# Patient Record
Sex: FEMALE | Race: OTHER | HISPANIC OR LATINO | URBAN - METROPOLITAN AREA
[De-identification: names, ages, dates, MRNs, and addresses within clinical notes are randomized per-mention and may not be internally consistent; named-entity substitution may affect disease eponyms.]

---

## 2018-12-20 ENCOUNTER — OUTPATIENT (OUTPATIENT)
Dept: OUTPATIENT SERVICES | Facility: HOSPITAL | Age: 33
LOS: 1 days | End: 2018-12-20
Payer: MEDICAID

## 2018-12-20 DIAGNOSIS — Z33.1 PREGNANT STATE, INCIDENTAL: ICD-10-CM

## 2018-12-20 DIAGNOSIS — Z01.818 ENCOUNTER FOR OTHER PREPROCEDURAL EXAMINATION: ICD-10-CM

## 2018-12-20 LAB
BLD GP AB SCN SERPL QL: NEGATIVE — SIGNIFICANT CHANGE UP
HCT VFR BLD CALC: 34.4 % — LOW (ref 34.5–45)
HGB BLD-MCNC: 11.3 G/DL — LOW (ref 11.5–15.5)
MCHC RBC-ENTMCNC: 28.5 PG — SIGNIFICANT CHANGE UP (ref 27–34)
MCHC RBC-ENTMCNC: 32.8 GM/DL — SIGNIFICANT CHANGE UP (ref 32–36)
MCV RBC AUTO: 86.6 FL — SIGNIFICANT CHANGE UP (ref 80–100)
PLATELET # BLD AUTO: 114 K/UL — LOW (ref 150–400)
RBC # BLD: 3.97 M/UL — SIGNIFICANT CHANGE UP (ref 3.8–5.2)
RBC # FLD: 13.2 % — SIGNIFICANT CHANGE UP (ref 10.3–14.5)
RH IG SCN BLD-IMP: POSITIVE — SIGNIFICANT CHANGE UP
WBC # BLD: 7.1 K/UL — SIGNIFICANT CHANGE UP (ref 3.8–10.5)
WBC # FLD AUTO: 7.1 K/UL — SIGNIFICANT CHANGE UP (ref 3.8–10.5)

## 2018-12-20 PROCEDURE — 86850 RBC ANTIBODY SCREEN: CPT

## 2018-12-20 PROCEDURE — G0463: CPT

## 2018-12-20 PROCEDURE — 86901 BLOOD TYPING SEROLOGIC RH(D): CPT

## 2018-12-20 PROCEDURE — 86900 BLOOD TYPING SEROLOGIC ABO: CPT

## 2018-12-20 PROCEDURE — 85027 COMPLETE CBC AUTOMATED: CPT

## 2018-12-30 ENCOUNTER — TRANSCRIPTION ENCOUNTER (OUTPATIENT)
Age: 33
End: 2018-12-30

## 2018-12-31 ENCOUNTER — INPATIENT (INPATIENT)
Facility: HOSPITAL | Age: 33
LOS: 2 days | Discharge: HOME | End: 2019-01-03
Attending: STUDENT IN AN ORGANIZED HEALTH CARE EDUCATION/TRAINING PROGRAM | Admitting: STUDENT IN AN ORGANIZED HEALTH CARE EDUCATION/TRAINING PROGRAM
Payer: MEDICAID

## 2018-12-31 VITALS
SYSTOLIC BLOOD PRESSURE: 114 MMHG | HEART RATE: 86 BPM | OXYGEN SATURATION: 100 % | RESPIRATION RATE: 16 BRPM | TEMPERATURE: 98 F | DIASTOLIC BLOOD PRESSURE: 55 MMHG

## 2018-12-31 DIAGNOSIS — Z3A.00 WEEKS OF GESTATION OF PREGNANCY NOT SPECIFIED: ICD-10-CM

## 2018-12-31 DIAGNOSIS — O26.899 OTHER SPECIFIED PREGNANCY RELATED CONDITIONS, UNSPECIFIED TRIMESTER: ICD-10-CM

## 2018-12-31 DIAGNOSIS — Z34.80 ENCOUNTER FOR SUPERVISION OF OTHER NORMAL PREGNANCY, UNSPECIFIED TRIMESTER: ICD-10-CM

## 2018-12-31 LAB
BASOPHILS # BLD AUTO: 0 K/UL — SIGNIFICANT CHANGE UP (ref 0–0.2)
BASOPHILS NFR BLD AUTO: 0.3 % — SIGNIFICANT CHANGE UP (ref 0–2)
BLD GP AB SCN SERPL QL: NEGATIVE — SIGNIFICANT CHANGE UP
EOSINOPHIL # BLD AUTO: 0 K/UL — SIGNIFICANT CHANGE UP (ref 0–0.5)
EOSINOPHIL NFR BLD AUTO: 0.2 % — SIGNIFICANT CHANGE UP (ref 0–6)
HBV SURFACE AG SERPL QL IA: SIGNIFICANT CHANGE UP
HCT VFR BLD CALC: 33.5 % — LOW (ref 34.5–45)
HGB BLD-MCNC: 11.6 G/DL — SIGNIFICANT CHANGE UP (ref 11.5–15.5)
LYMPHOCYTES # BLD AUTO: 1.4 K/UL — SIGNIFICANT CHANGE UP (ref 1–3.3)
LYMPHOCYTES # BLD AUTO: 13.6 % — SIGNIFICANT CHANGE UP (ref 13–44)
MCHC RBC-ENTMCNC: 30.1 PG — SIGNIFICANT CHANGE UP (ref 27–34)
MCHC RBC-ENTMCNC: 34.7 GM/DL — SIGNIFICANT CHANGE UP (ref 32–36)
MCV RBC AUTO: 86.7 FL — SIGNIFICANT CHANGE UP (ref 80–100)
MONOCYTES # BLD AUTO: 0.8 K/UL — SIGNIFICANT CHANGE UP (ref 0–0.9)
MONOCYTES NFR BLD AUTO: 8.3 % — SIGNIFICANT CHANGE UP (ref 2–14)
NEUTROPHILS # BLD AUTO: 7.9 K/UL — HIGH (ref 1.8–7.4)
NEUTROPHILS NFR BLD AUTO: 77.6 % — HIGH (ref 43–77)
PLATELET # BLD AUTO: 101 K/UL — LOW (ref 150–400)
RBC # BLD: 3.87 M/UL — SIGNIFICANT CHANGE UP (ref 3.8–5.2)
RBC # FLD: 12.4 % — SIGNIFICANT CHANGE UP (ref 10.3–14.5)
RH IG SCN BLD-IMP: POSITIVE — SIGNIFICANT CHANGE UP
WBC # BLD: 10.2 K/UL — SIGNIFICANT CHANGE UP (ref 3.8–10.5)
WBC # FLD AUTO: 10.2 K/UL — SIGNIFICANT CHANGE UP (ref 3.8–10.5)

## 2018-12-31 RX ORDER — ACETAMINOPHEN 500 MG
975 TABLET ORAL EVERY 6 HOURS
Qty: 0 | Refills: 0 | Status: DISCONTINUED | OUTPATIENT
Start: 2018-12-31 | End: 2019-01-03

## 2018-12-31 RX ORDER — LANOLIN
1 OINTMENT (GRAM) TOPICAL
Qty: 0 | Refills: 0 | Status: DISCONTINUED | OUTPATIENT
Start: 2019-01-01 | End: 2019-01-03

## 2018-12-31 RX ORDER — FERROUS SULFATE 325(65) MG
325 TABLET ORAL DAILY
Qty: 0 | Refills: 0 | Status: DISCONTINUED | OUTPATIENT
Start: 2019-01-01 | End: 2019-01-03

## 2018-12-31 RX ORDER — OXYCODONE HYDROCHLORIDE 5 MG/1
5 TABLET ORAL EVERY 4 HOURS
Qty: 0 | Refills: 0 | Status: COMPLETED | OUTPATIENT
Start: 2019-01-02 | End: 2019-01-09

## 2018-12-31 RX ORDER — SODIUM CHLORIDE 9 MG/ML
1000 INJECTION, SOLUTION INTRAVENOUS
Qty: 0 | Refills: 0 | Status: DISCONTINUED | OUTPATIENT
Start: 2018-12-31 | End: 2019-01-03

## 2018-12-31 RX ORDER — SIMETHICONE 80 MG/1
80 TABLET, CHEWABLE ORAL EVERY 4 HOURS
Qty: 0 | Refills: 0 | Status: DISCONTINUED | OUTPATIENT
Start: 2018-12-31 | End: 2019-01-03

## 2018-12-31 RX ORDER — IBUPROFEN 200 MG
600 TABLET ORAL EVERY 6 HOURS
Qty: 0 | Refills: 0 | Status: COMPLETED | OUTPATIENT
Start: 2019-01-02 | End: 2019-12-01

## 2018-12-31 RX ORDER — DOCUSATE SODIUM 100 MG
100 CAPSULE ORAL
Qty: 0 | Refills: 0 | Status: DISCONTINUED | OUTPATIENT
Start: 2019-01-01 | End: 2019-01-01

## 2018-12-31 RX ORDER — GLYCERIN ADULT
1 SUPPOSITORY, RECTAL RECTAL AT BEDTIME
Qty: 0 | Refills: 0 | Status: DISCONTINUED | OUTPATIENT
Start: 2019-01-01 | End: 2019-01-03

## 2018-12-31 RX ORDER — DEXAMETHASONE 0.5 MG/5ML
4 ELIXIR ORAL EVERY 6 HOURS
Qty: 0 | Refills: 0 | Status: DISCONTINUED | OUTPATIENT
Start: 2018-12-31 | End: 2019-01-02

## 2018-12-31 RX ORDER — SODIUM CHLORIDE 9 MG/ML
1000 INJECTION, SOLUTION INTRAVENOUS
Qty: 0 | Refills: 0 | Status: DISCONTINUED | OUTPATIENT
Start: 2019-01-01 | End: 2019-01-03

## 2018-12-31 RX ORDER — CITRIC ACID/SODIUM CITRATE 300-500 MG
30 SOLUTION, ORAL ORAL ONCE
Qty: 0 | Refills: 0 | Status: DISCONTINUED | OUTPATIENT
Start: 2018-12-31 | End: 2019-01-03

## 2018-12-31 RX ORDER — ONDANSETRON 8 MG/1
4 TABLET, FILM COATED ORAL EVERY 6 HOURS
Qty: 0 | Refills: 0 | Status: DISCONTINUED | OUTPATIENT
Start: 2018-12-31 | End: 2019-01-02

## 2018-12-31 RX ORDER — ACETAMINOPHEN 500 MG
1000 TABLET ORAL ONCE
Qty: 0 | Refills: 0 | Status: COMPLETED | OUTPATIENT
Start: 2018-12-31 | End: 2018-12-31

## 2018-12-31 RX ORDER — NALOXONE HYDROCHLORIDE 4 MG/.1ML
0.1 SPRAY NASAL
Qty: 0 | Refills: 0 | Status: DISCONTINUED | OUTPATIENT
Start: 2018-12-31 | End: 2019-01-02

## 2018-12-31 RX ORDER — TETANUS TOXOID, REDUCED DIPHTHERIA TOXOID AND ACELLULAR PERTUSSIS VACCINE, ADSORBED 5; 2.5; 8; 8; 2.5 [IU]/.5ML; [IU]/.5ML; UG/.5ML; UG/.5ML; UG/.5ML
0.5 SUSPENSION INTRAMUSCULAR ONCE
Qty: 0 | Refills: 0 | Status: DISCONTINUED | OUTPATIENT
Start: 2019-01-01 | End: 2019-01-03

## 2018-12-31 RX ORDER — KETOROLAC TROMETHAMINE 30 MG/ML
30 SYRINGE (ML) INJECTION EVERY 6 HOURS
Qty: 0 | Refills: 0 | Status: DISCONTINUED | OUTPATIENT
Start: 2018-12-31 | End: 2019-01-02

## 2018-12-31 RX ORDER — SODIUM CHLORIDE 9 MG/ML
1000 INJECTION, SOLUTION INTRAVENOUS ONCE
Qty: 0 | Refills: 0 | Status: COMPLETED | OUTPATIENT
Start: 2018-12-31 | End: 2018-12-31

## 2018-12-31 RX ORDER — METOCLOPRAMIDE HCL 10 MG
10 TABLET ORAL ONCE
Qty: 0 | Refills: 0 | Status: COMPLETED | OUTPATIENT
Start: 2018-12-31 | End: 2018-12-31

## 2018-12-31 RX ORDER — DIPHENHYDRAMINE HCL 50 MG
25 CAPSULE ORAL EVERY 6 HOURS
Qty: 0 | Refills: 0 | Status: DISCONTINUED | OUTPATIENT
Start: 2018-12-31 | End: 2019-01-03

## 2018-12-31 RX ORDER — OXYCODONE HYDROCHLORIDE 5 MG/1
5 TABLET ORAL
Qty: 0 | Refills: 0 | Status: COMPLETED | OUTPATIENT
Start: 2019-01-02 | End: 2019-01-09

## 2018-12-31 RX ORDER — HEPARIN SODIUM 5000 [USP'U]/ML
5000 INJECTION INTRAVENOUS; SUBCUTANEOUS EVERY 12 HOURS
Qty: 0 | Refills: 0 | Status: DISCONTINUED | OUTPATIENT
Start: 2018-12-31 | End: 2019-01-03

## 2018-12-31 RX ORDER — OXYTOCIN 10 UNIT/ML
41.67 VIAL (ML) INJECTION
Qty: 20 | Refills: 0 | Status: DISCONTINUED | OUTPATIENT
Start: 2019-01-01 | End: 2019-01-03

## 2018-12-31 RX ORDER — OXYTOCIN 10 UNIT/ML
333.33 VIAL (ML) INJECTION
Qty: 20 | Refills: 0 | Status: DISCONTINUED | OUTPATIENT
Start: 2018-12-31 | End: 2019-01-03

## 2018-12-31 RX ADMIN — Medication 975 MILLIGRAM(S): at 23:51

## 2018-12-31 RX ADMIN — Medication 10 MILLIGRAM(S): at 05:00

## 2018-12-31 RX ADMIN — SODIUM CHLORIDE 2000 MILLILITER(S): 9 INJECTION, SOLUTION INTRAVENOUS at 04:30

## 2018-12-31 RX ADMIN — Medication 30 MILLIGRAM(S): at 17:07

## 2018-12-31 RX ADMIN — HEPARIN SODIUM 5000 UNIT(S): 5000 INJECTION INTRAVENOUS; SUBCUTANEOUS at 17:07

## 2018-12-31 RX ADMIN — Medication 30 MILLIGRAM(S): at 23:49

## 2018-12-31 RX ADMIN — Medication 400 MILLIGRAM(S): at 09:40

## 2018-12-31 RX ADMIN — Medication 975 MILLIGRAM(S): at 17:07

## 2018-12-31 RX ADMIN — SODIUM CHLORIDE 125 MILLILITER(S): 9 INJECTION, SOLUTION INTRAVENOUS at 10:47

## 2019-01-01 LAB
HCT VFR BLD CALC: 27.9 % — LOW (ref 34.5–45)
HGB BLD-MCNC: 9.7 G/DL — LOW (ref 11.5–15.5)
MCHC RBC-ENTMCNC: 30.8 PG — SIGNIFICANT CHANGE UP (ref 27–34)
MCHC RBC-ENTMCNC: 34.7 GM/DL — SIGNIFICANT CHANGE UP (ref 32–36)
MCV RBC AUTO: 88.9 FL — SIGNIFICANT CHANGE UP (ref 80–100)
PLATELET # BLD AUTO: 92 K/UL — LOW (ref 150–400)
RBC # BLD: 3.14 M/UL — LOW (ref 3.8–5.2)
RBC # FLD: 12.6 % — SIGNIFICANT CHANGE UP (ref 10.3–14.5)
RUBV IGG SER-ACNC: 1.6 INDEX — SIGNIFICANT CHANGE UP
RUBV IGG SER-IMP: POSITIVE — SIGNIFICANT CHANGE UP
T PALLIDUM AB TITR SER: NEGATIVE — SIGNIFICANT CHANGE UP
WBC # BLD: 12.2 K/UL — HIGH (ref 3.8–10.5)
WBC # FLD AUTO: 12.2 K/UL — HIGH (ref 3.8–10.5)

## 2019-01-01 RX ORDER — ASCORBIC ACID 60 MG
500 TABLET,CHEWABLE ORAL DAILY
Qty: 0 | Refills: 0 | Status: DISCONTINUED | OUTPATIENT
Start: 2019-01-01 | End: 2019-01-03

## 2019-01-01 RX ORDER — DOCUSATE SODIUM 100 MG
100 CAPSULE ORAL
Qty: 0 | Refills: 0 | Status: DISCONTINUED | OUTPATIENT
Start: 2019-01-01 | End: 2019-01-03

## 2019-01-01 RX ORDER — FERROUS SULFATE 325(65) MG
325 TABLET ORAL
Qty: 0 | Refills: 0 | Status: DISCONTINUED | OUTPATIENT
Start: 2019-01-01 | End: 2019-01-03

## 2019-01-01 RX ADMIN — Medication 30 MILLIGRAM(S): at 13:50

## 2019-01-01 RX ADMIN — Medication 975 MILLIGRAM(S): at 17:12

## 2019-01-01 RX ADMIN — Medication 975 MILLIGRAM(S): at 06:09

## 2019-01-01 RX ADMIN — Medication 30 MILLIGRAM(S): at 13:34

## 2019-01-01 RX ADMIN — Medication 30 MILLIGRAM(S): at 06:09

## 2019-01-01 RX ADMIN — Medication 30 MILLIGRAM(S): at 20:40

## 2019-01-01 RX ADMIN — Medication 975 MILLIGRAM(S): at 05:39

## 2019-01-01 RX ADMIN — HEPARIN SODIUM 5000 UNIT(S): 5000 INJECTION INTRAVENOUS; SUBCUTANEOUS at 17:12

## 2019-01-01 RX ADMIN — Medication 325 MILLIGRAM(S): at 13:34

## 2019-01-01 RX ADMIN — HEPARIN SODIUM 5000 UNIT(S): 5000 INJECTION INTRAVENOUS; SUBCUTANEOUS at 05:38

## 2019-01-01 RX ADMIN — Medication 975 MILLIGRAM(S): at 00:21

## 2019-01-01 RX ADMIN — Medication 500 MILLIGRAM(S): at 13:34

## 2019-01-01 RX ADMIN — Medication 100 MILLIGRAM(S): at 17:13

## 2019-01-01 RX ADMIN — Medication 30 MILLIGRAM(S): at 05:38

## 2019-01-01 RX ADMIN — Medication 975 MILLIGRAM(S): at 19:02

## 2019-01-01 RX ADMIN — Medication 30 MILLIGRAM(S): at 00:21

## 2019-01-01 RX ADMIN — Medication 30 MILLIGRAM(S): at 19:43

## 2019-01-01 RX ADMIN — Medication 1 TABLET(S): at 13:34

## 2019-01-01 NOTE — CHART NOTE - NSCHARTNOTEFT_GEN_A_CORE
DAVID AYALA Postpartum    POD#1     Vital Signs Last 24 Hrs  T(C): 36.8 (2019 09:02), Max: 36.8 (2019 05:36)  T(F): 98.2 (2019 09:02), Max: 98.3 (2019 05:36)  HR: 58 (2019 09:02) (58 - 98)  BP: 104/62 (2019 09:02) (97/56 - 113/62)  BP(mean): 81 (31 Dec 2018 09:55) (76 - 81)  RR: 18 (2019 09:02) (18 - 20)  SpO2: 100% (2019 09:02) (97% - 100%)                          9.7    12.2  )-----------( 92       ( 2019 07:54 )             27.9   baso x      eos x      imm gran x      lymph x      mono x      poly x                            11.6   10.2  )-----------( 101      ( 31 Dec 2018 04:37 )             33.5   baso 0.3    eos 0.2    imm gran x      lymph 13.6   mono 8.3    poly 77.6         Plan: Anemia secondary to acute blood loss due to delivery and thrombocytopenia.  - Ferrous Sulfate, Colace, Vitamin C supplementation.  - Repeat CBC POD#2 to assess platelets prior to PCEA removal.  - Monitor for signs/symptoms of anemia.  No transfusion needed at this time.  LUCY Lange

## 2019-01-01 NOTE — PROGRESS NOTE ADULT - SUBJECTIVE AND OBJECTIVE BOX
OB Progress Note:  Delivery, POD#1    S: 34yo POD#1 s/p LTCS . Her pain is well controlled. She is tolerating a regular diet and passing flatus. Denies N/V. Denies CP/SOB/lightheadedness/dizziness.   She is ambulating without difficulty.   Voiding spontaneously  Denies heavy vaginal bleeding.    O:   Vital Signs Last 24 Hrs  T(C): 36.7 (31 Dec 2018 21:49), Max: 36.7 (31 Dec 2018 12:46)  T(F): 98.1 (31 Dec 2018 21:49), Max: 98.1 (31 Dec 2018 21:49)  HR: 69 (31 Dec 2018 21:49) (65 - 98)  BP: 99/60 (31 Dec 2018 21:49) (99/60 - 115/50)  BP(mean): 81 (31 Dec 2018 09:55) (72 - 81)  RR: 18 (31 Dec 2018 21:49) (15 - 21)  SpO2: 99% (31 Dec 2018 21:49) (97% - 100%)    Labs:  Blood type: O Positive  Rubella IgG: RPR:                           11.6   10.2 >-----------< 101<L>    (  @ 04:37 )             33.5<L>                  PE:  General: NAD  Abdomen: Mildly distended, appropriately tender, incision c/d/i.  Extremities: No erythema, no pitting edema

## 2019-01-01 NOTE — PROGRESS NOTE ADULT - PROBLEM SELECTOR PLAN 1
- Continue regular diet.  - Increase ambulation.  - continue PCEA for pain.  - F/u AM CBC    Harjinder Grullon PGY-1

## 2019-01-01 NOTE — PROGRESS NOTE ADULT - SUBJECTIVE AND OBJECTIVE BOX
Day _1__ of Anesthesia Pain Management Service    SUBJECTIVE:  Pain Scale Score	At rest: ___ 	With Activity: ___ 	[ x ] Refer to charted pain scores    THERAPY:  [ ] Epidural Bupivacaine 0.0625% and Hydromorphone 10 micrograms/mL  [ ] Epidural Ropivacaine 0.2% plain   [x ] Epidural Bupivacaine 0.01 % and Fentanyl 3 micrograms/mL  (OB)    Demand dose _3__ lockout _15__ (minutes) Continuous Rate _10__       MEDICATIONS  (STANDING):  acetaminophen   Tablet .. 975 milliGRAM(s) Oral every 6 hours  ascorbic acid 500 milliGRAM(s) Oral daily  citric acid/sodium citrate Solution 30 milliLiter(s) Oral once  diphtheria/tetanus/pertussis (acellular) Vaccine (ADAcel) 0.5 milliLiter(s) IntraMuscular once  docusate sodium 100 milliGRAM(s) Oral two times a day  fentaNYL (3 MICROgram(s)/mL) + BUpivacaine 0.01% in 0.9% Sodium Chloride PCEA 250 milliLiter(s) Epidural PCA Continuous  ferrous    sulfate 325 milliGRAM(s) Oral two times a day  ferrous    sulfate 325 milliGRAM(s) Oral daily  heparin  Injectable 5000 Unit(s) SubCutaneous every 12 hours  ketorolac   Injectable 30 milliGRAM(s) IV Push every 6 hours  lactated ringers. 1000 milliLiter(s) (125 mL/Hr) IV Continuous <Continuous>  lactated ringers. 1000 milliLiter(s) (125 mL/Hr) IV Continuous <Continuous>  oxytocin Infusion 333.333 milliUNIT(s)/Min (1000 mL/Hr) IV Continuous <Continuous>  oxytocin Infusion 41.667 milliUNIT(s)/Min (125 mL/Hr) IV Continuous <Continuous>  prenatal multivitamin 1 Tablet(s) Oral daily    MEDICATIONS  (PRN):  dexamethasone  Injectable 4 milliGRAM(s) IV Push every 6 hours PRN Nausea, IF ondansetron is ineffective after 30 - 60 minutes  diphenhydrAMINE 25 milliGRAM(s) Oral every 6 hours PRN Itching  fentaNYL (3 MICROgram(s)/mL) + BUpivacaine 0.01% in 0.9% Sodium Chloride PCEA Rescue Clinician Bolus 5 milliLiter(s) Epidural every 15 minutes PRN Moderate Pain (4 - 6)  glycerin Suppository - Adult 1 Suppository(s) Rectal at bedtime PRN Constipation  lanolin Ointment 1 Application(s) Topical every 3 hours PRN Sore Nipples  naloxone Injectable 0.1 milliGRAM(s) IV Push every 3 minutes PRN For ANY of the following changes in patient status:  A. RR LESS THAN 10 breaths per minute, B. Oxygen saturation LESS THAN 90%, C. Sedation score of 6  ondansetron Injectable 4 milliGRAM(s) IV Push every 6 hours PRN Nausea  simethicone 80 milliGRAM(s) Chew every 4 hours PRN Gas      OBJECTIVE:    Assessment of Epidural Catheter Site: 	    [x ] Dressing intact	[x ] Site non-tender	[x ] Site without erythema, discharge, edema  [x ] Epidural tubing and connection checked	[x [ Gross neurological exam within normal limits  [ ] Catheter removed – tip intact		                          9.7    12.2  )-----------( 92       ( 01 Jan 2019 07:54 )             27.9     Vital Signs Last 24 Hrs  T(C): 36.8 (01-01-19 @ 09:02), Max: 36.8 (01-01-19 @ 05:36)  T(F): 98.2 (01-01-19 @ 09:02), Max: 98.3 (01-01-19 @ 05:36)  HR: 58 (01-01-19 @ 09:02) (58 - 69)  BP: 104/62 (01-01-19 @ 09:02) (97/56 - 107/65)  BP(mean): --  RR: 18 (01-01-19 @ 09:02) (18 - 18)  SpO2: 100% (01-01-19 @ 09:02) (98% - 100%)      Sedation Score:	[ x] Alert	[ ] Drowsy	[ ] Arousable  [ ] Asleep  [ ] Unresponsive    Side Effects:	[x ] None	[ ] Nausea	[ ] Vomiting  [ ] Pruritus  		[ ] Weakness  [ ] Numbness  [ ] Other:    ASSESSMENT/ PLAN:    Therapy to  be:	[x ] Continue   [ ] Discontinued   [ ] Change to prn Analgesics    Documentation and Verification of current medications:  [ X ] Done	[ ] Not done, not eligible, reason:    Comments:I was Physically present for the key portions of the evaluation and managemeny service provided. I agree with the above history, physical, and plan which I have reviewed and edited where appropriate

## 2019-01-02 LAB
HCT VFR BLD CALC: 28.1 % — LOW (ref 34.5–45)
HGB BLD-MCNC: 9.7 G/DL — LOW (ref 11.5–15.5)
MCHC RBC-ENTMCNC: 30.7 PG — SIGNIFICANT CHANGE UP (ref 27–34)
MCHC RBC-ENTMCNC: 34.7 GM/DL — SIGNIFICANT CHANGE UP (ref 32–36)
MCV RBC AUTO: 88.5 FL — SIGNIFICANT CHANGE UP (ref 80–100)
PLATELET # BLD AUTO: 116 K/UL — LOW (ref 150–400)
RBC # BLD: 3.17 M/UL — LOW (ref 3.8–5.2)
RBC # FLD: 12.7 % — SIGNIFICANT CHANGE UP (ref 10.3–14.5)
WBC # BLD: 10.5 K/UL — SIGNIFICANT CHANGE UP (ref 3.8–10.5)
WBC # FLD AUTO: 10.5 K/UL — SIGNIFICANT CHANGE UP (ref 3.8–10.5)

## 2019-01-02 RX ORDER — IBUPROFEN 200 MG
600 TABLET ORAL EVERY 6 HOURS
Qty: 0 | Refills: 0 | Status: DISCONTINUED | OUTPATIENT
Start: 2019-01-02 | End: 2019-01-03

## 2019-01-02 RX ORDER — OXYCODONE HYDROCHLORIDE 5 MG/1
5 TABLET ORAL
Qty: 0 | Refills: 0 | Status: DISCONTINUED | OUTPATIENT
Start: 2019-01-02 | End: 2019-01-03

## 2019-01-02 RX ORDER — OXYCODONE HYDROCHLORIDE 5 MG/1
5 TABLET ORAL EVERY 4 HOURS
Qty: 0 | Refills: 0 | Status: DISCONTINUED | OUTPATIENT
Start: 2019-01-02 | End: 2019-01-03

## 2019-01-02 RX ADMIN — Medication 30 MILLIGRAM(S): at 08:01

## 2019-01-02 RX ADMIN — Medication 500 MILLIGRAM(S): at 12:04

## 2019-01-02 RX ADMIN — Medication 975 MILLIGRAM(S): at 22:15

## 2019-01-02 RX ADMIN — Medication 325 MILLIGRAM(S): at 10:06

## 2019-01-02 RX ADMIN — OXYCODONE HYDROCHLORIDE 5 MILLIGRAM(S): 5 TABLET ORAL at 15:15

## 2019-01-02 RX ADMIN — OXYCODONE HYDROCHLORIDE 5 MILLIGRAM(S): 5 TABLET ORAL at 14:34

## 2019-01-02 RX ADMIN — OXYCODONE HYDROCHLORIDE 5 MILLIGRAM(S): 5 TABLET ORAL at 17:57

## 2019-01-02 RX ADMIN — OXYCODONE HYDROCHLORIDE 5 MILLIGRAM(S): 5 TABLET ORAL at 06:50

## 2019-01-02 RX ADMIN — OXYCODONE HYDROCHLORIDE 5 MILLIGRAM(S): 5 TABLET ORAL at 10:45

## 2019-01-02 RX ADMIN — Medication 325 MILLIGRAM(S): at 12:04

## 2019-01-02 RX ADMIN — OXYCODONE HYDROCHLORIDE 5 MILLIGRAM(S): 5 TABLET ORAL at 12:04

## 2019-01-02 RX ADMIN — Medication 600 MILLIGRAM(S): at 17:57

## 2019-01-02 RX ADMIN — Medication 325 MILLIGRAM(S): at 17:57

## 2019-01-02 RX ADMIN — Medication 975 MILLIGRAM(S): at 06:50

## 2019-01-02 RX ADMIN — OXYCODONE HYDROCHLORIDE 5 MILLIGRAM(S): 5 TABLET ORAL at 21:14

## 2019-01-02 RX ADMIN — OXYCODONE HYDROCHLORIDE 5 MILLIGRAM(S): 5 TABLET ORAL at 10:07

## 2019-01-02 RX ADMIN — Medication 975 MILLIGRAM(S): at 21:14

## 2019-01-02 RX ADMIN — OXYCODONE HYDROCHLORIDE 5 MILLIGRAM(S): 5 TABLET ORAL at 22:15

## 2019-01-02 RX ADMIN — Medication 1 TABLET(S): at 12:04

## 2019-01-02 RX ADMIN — OXYCODONE HYDROCHLORIDE 5 MILLIGRAM(S): 5 TABLET ORAL at 05:10

## 2019-01-02 RX ADMIN — Medication 975 MILLIGRAM(S): at 05:53

## 2019-01-02 RX ADMIN — HEPARIN SODIUM 5000 UNIT(S): 5000 INJECTION INTRAVENOUS; SUBCUTANEOUS at 05:53

## 2019-01-02 RX ADMIN — Medication 975 MILLIGRAM(S): at 12:04

## 2019-01-02 RX ADMIN — Medication 30 MILLIGRAM(S): at 02:01

## 2019-01-02 RX ADMIN — Medication 100 MILLIGRAM(S): at 17:57

## 2019-01-02 RX ADMIN — HEPARIN SODIUM 5000 UNIT(S): 5000 INJECTION INTRAVENOUS; SUBCUTANEOUS at 17:57

## 2019-01-02 RX ADMIN — Medication 975 MILLIGRAM(S): at 01:15

## 2019-01-02 RX ADMIN — Medication 600 MILLIGRAM(S): at 18:36

## 2019-01-02 RX ADMIN — OXYCODONE HYDROCHLORIDE 5 MILLIGRAM(S): 5 TABLET ORAL at 12:45

## 2019-01-02 RX ADMIN — Medication 975 MILLIGRAM(S): at 12:45

## 2019-01-02 RX ADMIN — Medication 100 MILLIGRAM(S): at 10:07

## 2019-01-02 RX ADMIN — Medication 30 MILLIGRAM(S): at 02:50

## 2019-01-02 RX ADMIN — Medication 30 MILLIGRAM(S): at 08:30

## 2019-01-02 RX ADMIN — Medication 975 MILLIGRAM(S): at 00:17

## 2019-01-02 RX ADMIN — OXYCODONE HYDROCHLORIDE 5 MILLIGRAM(S): 5 TABLET ORAL at 18:36

## 2019-01-02 RX ADMIN — OXYCODONE HYDROCHLORIDE 5 MILLIGRAM(S): 5 TABLET ORAL at 04:17

## 2019-01-02 RX ADMIN — OXYCODONE HYDROCHLORIDE 5 MILLIGRAM(S): 5 TABLET ORAL at 05:55

## 2019-01-02 NOTE — PROGRESS NOTE ADULT - SUBJECTIVE AND OBJECTIVE BOX
Day 2 of Anesthesia Pain Management Service    SUBJECTIVE: I'm okay  Pain Scale Score:    [X] Refer to charted pain scores    THERAPY: Epidural Bupivacaine 0.01 % and Fentanyl 3 micrograms/mL     Demand Dose: 3 mL  Lockout: 15 minutes   Continuous Rate:  10 mL    MEDICATIONS  (STANDING):  acetaminophen   Tablet .. 975 milliGRAM(s) Oral every 6 hours  ascorbic acid 500 milliGRAM(s) Oral daily  citric acid/sodium citrate Solution 30 milliLiter(s) Oral once  diphtheria/tetanus/pertussis (acellular) Vaccine (ADAcel) 0.5 milliLiter(s) IntraMuscular once  docusate sodium 100 milliGRAM(s) Oral two times a day  fentaNYL (3 MICROgram(s)/mL) + BUpivacaine 0.01% in 0.9% Sodium Chloride PCEA 250 milliLiter(s) Epidural PCA Continuous  ferrous    sulfate 325 milliGRAM(s) Oral two times a day  ferrous    sulfate 325 milliGRAM(s) Oral daily  heparin  Injectable 5000 Unit(s) SubCutaneous every 12 hours  ketorolac   Injectable 30 milliGRAM(s) IV Push every 6 hours  lactated ringers. 1000 milliLiter(s) (125 mL/Hr) IV Continuous <Continuous>  lactated ringers. 1000 milliLiter(s) (125 mL/Hr) IV Continuous <Continuous>  oxyCODONE    IR 5 milliGRAM(s) Oral every 3 hours  oxytocin Infusion 333.333 milliUNIT(s)/Min (1000 mL/Hr) IV Continuous <Continuous>  oxytocin Infusion 41.667 milliUNIT(s)/Min (125 mL/Hr) IV Continuous <Continuous>  prenatal multivitamin 1 Tablet(s) Oral daily    MEDICATIONS  (PRN):  dexamethasone  Injectable 4 milliGRAM(s) IV Push every 6 hours PRN Nausea, IF ondansetron is ineffective after 30 - 60 minutes  diphenhydrAMINE 25 milliGRAM(s) Oral every 6 hours PRN Itching  fentaNYL (3 MICROgram(s)/mL) + BUpivacaine 0.01% in 0.9% Sodium Chloride PCEA Rescue Clinician Bolus 5 milliLiter(s) Epidural every 15 minutes PRN Moderate Pain (4 - 6)  glycerin Suppository - Adult 1 Suppository(s) Rectal at bedtime PRN Constipation  lanolin Ointment 1 Application(s) Topical every 3 hours PRN Sore Nipples  naloxone Injectable 0.1 milliGRAM(s) IV Push every 3 minutes PRN For ANY of the following changes in patient status:  A. RR LESS THAN 10 breaths per minute, B. Oxygen saturation LESS THAN 90%, C. Sedation score of 6  ondansetron Injectable 4 milliGRAM(s) IV Push every 6 hours PRN Nausea  oxyCODONE    IR 5 milliGRAM(s) Oral every 4 hours PRN Severe Pain (7 - 10)  simethicone 80 milliGRAM(s) Chew every 4 hours PRN Gas      OBJECTIVE:    Assessment of Epidural Catheter Site: 	    [ ] Dressing intact	[X] Site non-tender	[X] Site without erythema, discharge, edema  [ ] Epidural tubing and connection checked	[X] Gross neurological exam within normal limits  [X] Catheter removed                          9.7    10.5  )-----------( 116      ( 02 Jan 2019 06:44 )             28.1     Vital Signs Last 24 Hrs  T(C): 36.9 (01-02-19 @ 05:34), Max: 37 (01-01-19 @ 21:00)  T(F): 98.5 (01-02-19 @ 05:34), Max: 98.6 (01-01-19 @ 21:00)  HR: 76 (01-02-19 @ 05:34) (58 - 85)  BP: 113/68 (01-02-19 @ 05:34) (99/60 - 113/68)  BP(mean): --  RR: 18 (01-02-19 @ 05:34) (17 - 18)  SpO2: 100% (01-02-19 @ 05:34) (98% - 100%)      Sedation Score:	[X] Alert	[ ] Drowsy	[ ] Arousable  [ ] Asleep  [ ] Unresponsive    Side Effects:	[X] None	[ ] Nausea	[ ] Vomiting  [ ] Pruritus  		[ ] Weakness  [ ] Numbness  [ ] Other:    ASSESSMENT/ PLAN:    Therapy:                         [ ] Continue   [X] Discontinue   [X] Change to PRN Analgesics    Documentation and Verification of current medications:  [X] Done	[ ] Not done, not eligible, reason:    Comments:

## 2019-01-02 NOTE — PROGRESS NOTE ADULT - PROBLEM SELECTOR PLAN 1
- Continue regular diet.  - Increase ambulation.  - D/c PCEA today and transition to PO pain medication with motrin, tylenol and oxycodone PRN.     Claudia Clay, PGY-1

## 2019-01-02 NOTE — PROGRESS NOTE ADULT - SUBJECTIVE AND OBJECTIVE BOX
OB Progress Note:  Delivery, POD#2    S: 33y POD#2 s/p LTCS. No acute overnight events. PCEA ran out overnight, pain now controlled w/ PO analgesia. She is tolerating a regular diet and passing flatus. She is voiding spontaneously and ambulating without difficulty. Denies CP, SOB, lightheadedness, dizziness. Denies N/V.    O:  Vitals:  Vital Signs Last 24 Hrs  T(C): 36.9 (2019 05:34), Max: 37 (2019 21:00)  T(F): 98.5 (2019 05:34), Max: 98.6 (2019 21:00)  HR: 76 (2019 05:34) (58 - 85)  BP: 113/68 (2019 05:34) (99/60 - 113/68)  RR: 18 (2019 05:34) (17 - 18)  SpO2: 100% (2019 05:34) (98% - 100%)    PE:  General: NAD  Abdomen: Mildly distended, appropriately tender, firm uterine fundus   Incision: clean, dry, intact  Extremities: No erythema, no pitting edema, non-tender    Labs:  Blood Type: O Positive  Antibody Screen: Negative  Rubella IgG: Positive (Positive=Immune, Negative=Non-Immune)  RPR: Negative                            9.7    12.2  )-----------( 92       ( 2019 07:54 )             27.9                 Medications:  MEDICATIONS  (STANDING):  acetaminophen   Tablet .. 975 milliGRAM(s) Oral every 6 hours  ascorbic acid 500 milliGRAM(s) Oral daily  citric acid/sodium citrate Solution 30 milliLiter(s) Oral once  diphtheria/tetanus/pertussis (acellular) Vaccine (ADAcel) 0.5 milliLiter(s) IntraMuscular once  docusate sodium 100 milliGRAM(s) Oral two times a day  fentaNYL (3 MICROgram(s)/mL) + BUpivacaine 0.01% in 0.9% Sodium Chloride PCEA 250 milliLiter(s) Epidural PCA Continuous  ferrous    sulfate 325 milliGRAM(s) Oral two times a day  ferrous    sulfate 325 milliGRAM(s) Oral daily  heparin  Injectable 5000 Unit(s) SubCutaneous every 12 hours  ketorolac   Injectable 30 milliGRAM(s) IV Push every 6 hours  lactated ringers. 1000 milliLiter(s) (125 mL/Hr) IV Continuous <Continuous>  lactated ringers. 1000 milliLiter(s) (125 mL/Hr) IV Continuous <Continuous>  oxyCODONE    IR 5 milliGRAM(s) Oral every 3 hours  oxytocin Infusion 333.333 milliUNIT(s)/Min (1000 mL/Hr) IV Continuous <Continuous>  oxytocin Infusion 41.667 milliUNIT(s)/Min (125 mL/Hr) IV Continuous <Continuous>  prenatal multivitamin 1 Tablet(s) Oral daily    MEDICATIONS  (PRN):  dexamethasone  Injectable 4 milliGRAM(s) IV Push every 6 hours PRN Nausea, IF ondansetron is ineffective after 30 - 60 minutes  diphenhydrAMINE 25 milliGRAM(s) Oral every 6 hours PRN Itching  fentaNYL (3 MICROgram(s)/mL) + BUpivacaine 0.01% in 0.9% Sodium Chloride PCEA Rescue Clinician Bolus 5 milliLiter(s) Epidural every 15 minutes PRN Moderate Pain (4 - 6)  glycerin Suppository - Adult 1 Suppository(s) Rectal at bedtime PRN Constipation  lanolin Ointment 1 Application(s) Topical every 3 hours PRN Sore Nipples  naloxone Injectable 0.1 milliGRAM(s) IV Push every 3 minutes PRN For ANY of the following changes in patient status:  A. RR LESS THAN 10 breaths per minute, B. Oxygen saturation LESS THAN 90%, C. Sedation score of 6  ondansetron Injectable 4 milliGRAM(s) IV Push every 6 hours PRN Nausea  oxyCODONE    IR 5 milliGRAM(s) Oral every 4 hours PRN Severe Pain (7 - 10)  simethicone 80 milliGRAM(s) Chew every 4 hours PRN Gas

## 2019-01-03 ENCOUNTER — TRANSCRIPTION ENCOUNTER (OUTPATIENT)
Age: 34
End: 2019-01-03

## 2019-01-03 VITALS
TEMPERATURE: 98 F | HEART RATE: 79 BPM | OXYGEN SATURATION: 98 % | SYSTOLIC BLOOD PRESSURE: 93 MMHG | DIASTOLIC BLOOD PRESSURE: 62 MMHG | RESPIRATION RATE: 18 BRPM

## 2019-01-03 LAB
HCT VFR BLD CALC: 27.8 % — LOW (ref 34.5–45)
HGB BLD-MCNC: 9.6 G/DL — LOW (ref 11.5–15.5)
MCHC RBC-ENTMCNC: 30.6 PG — SIGNIFICANT CHANGE UP (ref 27–34)
MCHC RBC-ENTMCNC: 34.3 GM/DL — SIGNIFICANT CHANGE UP (ref 32–36)
MCV RBC AUTO: 89.1 FL — SIGNIFICANT CHANGE UP (ref 80–100)
PLATELET # BLD AUTO: 123 K/UL — LOW (ref 150–400)
RBC # BLD: 3.13 M/UL — LOW (ref 3.8–5.2)
RBC # FLD: 12.6 % — SIGNIFICANT CHANGE UP (ref 10.3–14.5)
WBC # BLD: 9.4 K/UL — SIGNIFICANT CHANGE UP (ref 3.8–10.5)
WBC # FLD AUTO: 9.4 K/UL — SIGNIFICANT CHANGE UP (ref 3.8–10.5)

## 2019-01-03 RX ORDER — OXYCODONE HYDROCHLORIDE 5 MG/1
1 TABLET ORAL
Qty: 0 | Refills: 0 | DISCHARGE
Start: 2019-01-03

## 2019-01-03 RX ADMIN — OXYCODONE HYDROCHLORIDE 5 MILLIGRAM(S): 5 TABLET ORAL at 03:32

## 2019-01-03 RX ADMIN — OXYCODONE HYDROCHLORIDE 5 MILLIGRAM(S): 5 TABLET ORAL at 12:55

## 2019-01-03 RX ADMIN — Medication 975 MILLIGRAM(S): at 09:02

## 2019-01-03 RX ADMIN — OXYCODONE HYDROCHLORIDE 5 MILLIGRAM(S): 5 TABLET ORAL at 06:01

## 2019-01-03 RX ADMIN — Medication 100 MILLIGRAM(S): at 06:01

## 2019-01-03 RX ADMIN — OXYCODONE HYDROCHLORIDE 5 MILLIGRAM(S): 5 TABLET ORAL at 01:30

## 2019-01-03 RX ADMIN — Medication 600 MILLIGRAM(S): at 01:30

## 2019-01-03 RX ADMIN — Medication 500 MILLIGRAM(S): at 12:27

## 2019-01-03 RX ADMIN — Medication 325 MILLIGRAM(S): at 12:28

## 2019-01-03 RX ADMIN — OXYCODONE HYDROCHLORIDE 5 MILLIGRAM(S): 5 TABLET ORAL at 00:35

## 2019-01-03 RX ADMIN — Medication 1 TABLET(S): at 12:28

## 2019-01-03 RX ADMIN — OXYCODONE HYDROCHLORIDE 5 MILLIGRAM(S): 5 TABLET ORAL at 10:00

## 2019-01-03 RX ADMIN — Medication 600 MILLIGRAM(S): at 12:55

## 2019-01-03 RX ADMIN — Medication 600 MILLIGRAM(S): at 06:00

## 2019-01-03 RX ADMIN — Medication 600 MILLIGRAM(S): at 12:27

## 2019-01-03 RX ADMIN — HEPARIN SODIUM 5000 UNIT(S): 5000 INJECTION INTRAVENOUS; SUBCUTANEOUS at 06:01

## 2019-01-03 RX ADMIN — OXYCODONE HYDROCHLORIDE 5 MILLIGRAM(S): 5 TABLET ORAL at 12:28

## 2019-01-03 RX ADMIN — Medication 600 MILLIGRAM(S): at 00:34

## 2019-01-03 RX ADMIN — Medication 975 MILLIGRAM(S): at 03:32

## 2019-01-03 RX ADMIN — OXYCODONE HYDROCHLORIDE 5 MILLIGRAM(S): 5 TABLET ORAL at 04:30

## 2019-01-03 RX ADMIN — Medication 600 MILLIGRAM(S): at 07:00

## 2019-01-03 RX ADMIN — OXYCODONE HYDROCHLORIDE 5 MILLIGRAM(S): 5 TABLET ORAL at 09:02

## 2019-01-03 RX ADMIN — OXYCODONE HYDROCHLORIDE 5 MILLIGRAM(S): 5 TABLET ORAL at 07:00

## 2019-01-03 RX ADMIN — Medication 975 MILLIGRAM(S): at 04:30

## 2019-01-03 RX ADMIN — Medication 975 MILLIGRAM(S): at 10:00

## 2019-01-03 RX ADMIN — Medication 325 MILLIGRAM(S): at 06:01

## 2019-01-03 NOTE — PROGRESS NOTE ADULT - SUBJECTIVE AND OBJECTIVE BOX
OB Postpartum Note:  Delivery, POD#3    S: 34yo POD#3 s/p LTCS. The patient feels well.  Pain is well controlled. She is tolerating a regular diet and passing flatus. She is voiding spontaneously, and ambulating without difficulty. Denies CP/SOB. Denies lightheadedness/dizziness. Denies N/V. Denies heavy vaginal bleeding.    O:  Vitals:  Vital Signs Last 24 Hrs  T(C): 36.8 (2019 21:51), Max: 36.9 (2019 05:34)  T(F): 98.2 (2019 21:51), Max: 98.5 (2019 05:34)  HR: 79 (2019 21:51) (76 - 79)  BP: 109/64 (2019 21:51) (104/67 - 113/68)  BP(mean): --  RR: 17 (2019 21:51) (17 - 18)  SpO2: 100% (2019 21:51) (98% - 100%)    MEDICATIONS  (STANDING):  acetaminophen   Tablet .. 975 milliGRAM(s) Oral every 6 hours  ascorbic acid 500 milliGRAM(s) Oral daily  citric acid/sodium citrate Solution 30 milliLiter(s) Oral once  diphtheria/tetanus/pertussis (acellular) Vaccine (ADAcel) 0.5 milliLiter(s) IntraMuscular once  docusate sodium 100 milliGRAM(s) Oral two times a day  ferrous    sulfate 325 milliGRAM(s) Oral two times a day  ferrous    sulfate 325 milliGRAM(s) Oral daily  heparin  Injectable 5000 Unit(s) SubCutaneous every 12 hours  ibuprofen  Tablet. 600 milliGRAM(s) Oral every 6 hours  lactated ringers. 1000 milliLiter(s) (125 mL/Hr) IV Continuous <Continuous>  lactated ringers. 1000 milliLiter(s) (125 mL/Hr) IV Continuous <Continuous>  oxyCODONE    IR 5 milliGRAM(s) Oral every 3 hours  oxytocin Infusion 333.333 milliUNIT(s)/Min (1000 mL/Hr) IV Continuous <Continuous>  oxytocin Infusion 41.667 milliUNIT(s)/Min (125 mL/Hr) IV Continuous <Continuous>  prenatal multivitamin 1 Tablet(s) Oral daily    MEDICATIONS  (PRN):  diphenhydrAMINE 25 milliGRAM(s) Oral every 6 hours PRN Itching  glycerin Suppository - Adult 1 Suppository(s) Rectal at bedtime PRN Constipation  lanolin Ointment 1 Application(s) Topical every 3 hours PRN Sore Nipples  oxyCODONE    IR 5 milliGRAM(s) Oral every 4 hours PRN Severe Pain (7 - 10)  simethicone 80 milliGRAM(s) Chew every 4 hours PRN Gas      LABS:  Blood type: O Positive  Rubella IgG: Positive ( @ 07:30)  RPR: Negative                          9.7<L>   10.5 >-----------< 116<L>    (  @ 06:44 )             28.1<L>                        9.7<L>   12.2<H> >-----------< 92<L>    (  @ 07:54 )             27.9<L>          Physical exam:  Gen: NAD  Abdomen: Soft, nontender, no distension , firm uterine fundus at umbilicus.  Incision: Clean, dry, and intact   Pelvic: Normal lochia noted  Ext: No calf tenderness

## 2019-01-03 NOTE — DISCHARGE NOTE OB - MATERIALS PROVIDED
Breastfeeding Guide and Packet/Guide to Postpartum Care/Back To Sleep Handout/Breastfeeding Log/[place]  Immunization Record

## 2019-01-03 NOTE — PROGRESS NOTE ADULT - PROBLEM SELECTOR PLAN 1
- Continue motrin, tylenol, oxycodone PRN for pain control.  - Increase ambulation  - Continue regular diet  - Discharge planning    Harjinder Grullon PGY-1

## 2019-01-03 NOTE — DISCHARGE NOTE OB - PATIENT PORTAL LINK FT
You can access the [facility] Patient Portal, offered by [facility], by registering with the following website: [email or web address]

## 2019-01-03 NOTE — DISCHARGE NOTE OB - MEDICATION SUMMARY - MEDICATIONS TO TAKE
I will START or STAY ON the medications listed below when I get home from the hospital:    oxyCODONE 5 mg oral tablet  -- 1 tab(s) by mouth every 4 hours, As needed, Severe Pain (7 - 10)  -- Indication: For pain

## 2019-01-03 NOTE — DISCHARGE NOTE OB - CARE PROVIDER_API CALL
Savana Johansen (MD), Obstetrics  Gynecology  [address] [place] [id number]  Phone: [phone]  Fax: [phone]

## 2019-01-09 PROCEDURE — G0463: CPT

## 2019-01-09 PROCEDURE — 86780 TREPONEMA PALLIDUM: CPT

## 2019-01-09 PROCEDURE — 87340 HEPATITIS B SURFACE AG IA: CPT

## 2019-01-09 PROCEDURE — 86762 RUBELLA ANTIBODY: CPT

## 2019-01-09 PROCEDURE — 85027 COMPLETE CBC AUTOMATED: CPT

## 2019-01-09 PROCEDURE — 59050 FETAL MONITOR W/REPORT: CPT

## 2019-01-09 PROCEDURE — 59025 FETAL NON-STRESS TEST: CPT

## 2021-04-27 ENCOUNTER — APPOINTMENT (OUTPATIENT)
Dept: OBGYN | Facility: CLINIC | Age: 36
End: 2021-04-27

## 2021-05-05 ENCOUNTER — ASOB RESULT (OUTPATIENT)
Age: 36
End: 2021-05-05

## 2021-05-05 ENCOUNTER — APPOINTMENT (OUTPATIENT)
Dept: ANTEPARTUM | Facility: CLINIC | Age: 36
End: 2021-05-05
Payer: MEDICAID

## 2021-05-05 PROCEDURE — 76811 OB US DETAILED SNGL FETUS: CPT

## 2021-05-05 PROCEDURE — 99072 ADDL SUPL MATRL&STAF TM PHE: CPT

## 2021-08-20 ENCOUNTER — OUTPATIENT (OUTPATIENT)
Dept: OUTPATIENT SERVICES | Facility: HOSPITAL | Age: 36
LOS: 1 days | End: 2021-08-20
Payer: MEDICAID

## 2021-08-20 VITALS
SYSTOLIC BLOOD PRESSURE: 114 MMHG | WEIGHT: 169.98 LBS | HEIGHT: 67 IN | TEMPERATURE: 98 F | RESPIRATION RATE: 16 BRPM | DIASTOLIC BLOOD PRESSURE: 77 MMHG | HEART RATE: 84 BPM | OXYGEN SATURATION: 96 %

## 2021-08-20 DIAGNOSIS — Z98.891 HISTORY OF UTERINE SCAR FROM PREVIOUS SURGERY: Chronic | ICD-10-CM

## 2021-08-20 DIAGNOSIS — Z01.818 ENCOUNTER FOR OTHER PREPROCEDURAL EXAMINATION: ICD-10-CM

## 2021-08-20 DIAGNOSIS — O34.211 MATERNAL CARE FOR LOW TRANSVERSE SCAR FROM PREVIOUS CESAREAN DELIVERY: ICD-10-CM

## 2021-08-20 DIAGNOSIS — Z29.9 ENCOUNTER FOR PROPHYLACTIC MEASURES, UNSPECIFIED: ICD-10-CM

## 2021-08-20 DIAGNOSIS — Z90.79 ACQUIRED ABSENCE OF OTHER GENITAL ORGAN(S): Chronic | ICD-10-CM

## 2021-08-20 LAB
ANION GAP SERPL CALC-SCNC: 12 MMOL/L — SIGNIFICANT CHANGE UP (ref 5–17)
BLD GP AB SCN SERPL QL: NEGATIVE — SIGNIFICANT CHANGE UP
BUN SERPL-MCNC: 5 MG/DL — LOW (ref 7–23)
CALCIUM SERPL-MCNC: 9.6 MG/DL — SIGNIFICANT CHANGE UP (ref 8.4–10.5)
CHLORIDE SERPL-SCNC: 101 MMOL/L — SIGNIFICANT CHANGE UP (ref 96–108)
CO2 SERPL-SCNC: 20 MMOL/L — LOW (ref 22–31)
CREAT SERPL-MCNC: 0.61 MG/DL — SIGNIFICANT CHANGE UP (ref 0.5–1.3)
GLUCOSE SERPL-MCNC: 82 MG/DL — SIGNIFICANT CHANGE UP (ref 70–99)
HCT VFR BLD CALC: 35.6 % — SIGNIFICANT CHANGE UP (ref 34.5–45)
HGB BLD-MCNC: 11.7 G/DL — SIGNIFICANT CHANGE UP (ref 11.5–15.5)
MCHC RBC-ENTMCNC: 29.1 PG — SIGNIFICANT CHANGE UP (ref 27–34)
MCHC RBC-ENTMCNC: 32.9 GM/DL — SIGNIFICANT CHANGE UP (ref 32–36)
MCV RBC AUTO: 88.6 FL — SIGNIFICANT CHANGE UP (ref 80–100)
NRBC # BLD: 0 /100 WBCS — SIGNIFICANT CHANGE UP (ref 0–0)
PLATELET # BLD AUTO: 127 K/UL — LOW (ref 150–400)
POTASSIUM SERPL-MCNC: 4.1 MMOL/L — SIGNIFICANT CHANGE UP (ref 3.5–5.3)
POTASSIUM SERPL-SCNC: 4.1 MMOL/L — SIGNIFICANT CHANGE UP (ref 3.5–5.3)
RBC # BLD: 4.02 M/UL — SIGNIFICANT CHANGE UP (ref 3.8–5.2)
RBC # FLD: 13.2 % — SIGNIFICANT CHANGE UP (ref 10.3–14.5)
RH IG SCN BLD-IMP: POSITIVE — SIGNIFICANT CHANGE UP
SODIUM SERPL-SCNC: 133 MMOL/L — LOW (ref 135–145)
WBC # BLD: 9.73 K/UL — SIGNIFICANT CHANGE UP (ref 3.8–10.5)
WBC # FLD AUTO: 9.73 K/UL — SIGNIFICANT CHANGE UP (ref 3.8–10.5)

## 2021-08-20 PROCEDURE — 85027 COMPLETE CBC AUTOMATED: CPT

## 2021-08-20 PROCEDURE — 86901 BLOOD TYPING SEROLOGIC RH(D): CPT

## 2021-08-20 PROCEDURE — 86900 BLOOD TYPING SEROLOGIC ABO: CPT

## 2021-08-20 PROCEDURE — 80048 BASIC METABOLIC PNL TOTAL CA: CPT

## 2021-08-20 PROCEDURE — G0463: CPT

## 2021-08-20 PROCEDURE — 86850 RBC ANTIBODY SCREEN: CPT

## 2021-08-20 RX ORDER — CHLORHEXIDINE GLUCONATE 213 G/1000ML
1 SOLUTION TOPICAL ONCE
Refills: 0 | Status: COMPLETED | OUTPATIENT
Start: 2021-08-24 | End: 2021-08-24

## 2021-08-20 RX ORDER — MONTELUKAST 4 MG/1
1 TABLET, CHEWABLE ORAL
Qty: 0 | Refills: 0 | DISCHARGE

## 2021-08-20 RX ORDER — CITRIC ACID/SODIUM CITRATE 300-500 MG
15 SOLUTION, ORAL ORAL ONCE
Refills: 0 | Status: COMPLETED | OUTPATIENT
Start: 2021-08-24 | End: 2021-08-24

## 2021-08-20 RX ORDER — SODIUM CHLORIDE 9 MG/ML
1000 INJECTION, SOLUTION INTRAVENOUS
Refills: 0 | Status: DISCONTINUED | OUTPATIENT
Start: 2021-08-24 | End: 2021-08-25

## 2021-08-20 RX ORDER — FAMOTIDINE 10 MG/ML
20 INJECTION INTRAVENOUS ONCE
Refills: 0 | Status: COMPLETED | OUTPATIENT
Start: 2021-08-24 | End: 2021-08-24

## 2021-08-20 RX ORDER — SODIUM CHLORIDE 9 MG/ML
1000 INJECTION, SOLUTION INTRAVENOUS ONCE
Refills: 0 | Status: COMPLETED | OUTPATIENT
Start: 2021-08-24 | End: 2021-08-24

## 2021-08-20 RX ORDER — ALBUTEROL 90 UG/1
2 AEROSOL, METERED ORAL
Qty: 0 | Refills: 0 | DISCHARGE

## 2021-08-20 RX ORDER — CEFAZOLIN SODIUM 1 G
2000 VIAL (EA) INJECTION ONCE
Refills: 0 | Status: DISCONTINUED | OUTPATIENT
Start: 2021-08-24 | End: 2021-08-26

## 2021-08-20 RX ORDER — OXYTOCIN 10 UNIT/ML
333.33 VIAL (ML) INJECTION
Qty: 20 | Refills: 0 | Status: DISCONTINUED | OUTPATIENT
Start: 2021-08-24 | End: 2021-08-26

## 2021-08-20 NOTE — OB PST NOTE - HISTORY OF PRESENT ILLNESS
y/o female GP  34 y/o female , 37 weeks gestations with h/o C section planned for repeat C section on 21.      Covid test 21

## 2021-08-20 NOTE — OB PST NOTE - ASSESSMENT
CAPRINI SCORE [CLOT updated 18]    AGE RELATED RISK FACTORS                                                       MOBILITY RELATED FACTORS  [ ] Age 41-60 years                                            (1 Point)                    [ ] Bed rest                                                        (1 Point)  [ ] Age: 61-74 years                                           (2 Points)                  [ ] Plaster cast                                                   (2 Points)  [ ] Age= 75 years                                              (3 Points)                    [ ] Bed bound for more than 72 hours                 (2 Points)    DISEASE RELATED RISK FACTORS                                               GENDER SPECIFIC FACTORS  [ ] Edema in the lower extremities                       (1 Point)              [ x] Pregnancy                                                     (1 Point)  [ ] Varicose veins                                               (1 Point)                     [ ] Post-partum < 6 weeks                                   (1 Point)             [x ] BMI > 25 Kg/m2                                            (1 Point)                     [ ] Hormonal therapy  or oral contraception          (1 Point)                 [ ] Sepsis (in the previous month)                        (1 Point)               [ ] History of pregnancy complications                 (1 point)  [ ] Pneumonia or serious lung disease                                               [ ] Unexplained or recurrent                     (1 Point)           (in the previous month)                               (1 Point)  [ ] Abnormal pulmonary function test                     (1 Point)                 SURGERY RELATED RISK FACTORS  [ ] Acute myocardial infarction                              (1 Point)               [ ]  Section                                             (1 Point)  [ ] Congestive heart failure (in the previous month)  (1 Point)      [ ] Minor surgery                                                  (1 Point)   [ ] Inflammatory bowel disease                             (1 Point)               [ ] Arthroscopic surgery                                        (2 Points)  [ ] Central venous access                                      (2 Points)                [x] General surgery lasting more than 45 minutes (2 points)  [ ] Present or previous malignancy                     (2 Points)                [ ] Elective arthroplasty                                         (5 points)    [ ] Stroke (in the previous month)                          (5 Points)                                                                                                                                                           HEMATOLOGY RELATED FACTORS                                                 TRAUMA RELATED RISK FACTORS  [ ] Prior episodes of VTE                                     (3 Points)                [ ] Fracture of the hip, pelvis, or leg                       (5 Points)  [ ] Positive family history for VTE                         (3 Points)             [ ] Acute spinal cord injury (in the previous month)  (5 Points)  [ ] Prothrombin 44100 A                                     (3 Points)               [ ] Paralysis  (less than 1 month)                             (5 Points)  [ ] Factor V Leiden                                             (3 Points)                  [ ] Multiple Trauma within 1 month                        (5 Points)  [ ] Lupus anticoagulants                                     (3 Points)                                                           [ ] Anticardiolipin antibodies                               (3 Points)                                                       [ ] High homocysteine in the blood                      (3 Points)                                             [ ] Other congenital or acquired thrombophilia      (3 Points)                                                [ ] Heparin induced thrombocytopenia                  (3 Points)                                     Total Score [   4       ]

## 2021-08-23 ENCOUNTER — OUTPATIENT (OUTPATIENT)
Dept: OUTPATIENT SERVICES | Facility: HOSPITAL | Age: 36
LOS: 1 days | End: 2021-08-23
Payer: MEDICAID

## 2021-08-23 ENCOUNTER — TRANSCRIPTION ENCOUNTER (OUTPATIENT)
Age: 36
End: 2021-08-23

## 2021-08-23 DIAGNOSIS — Z11.52 ENCOUNTER FOR SCREENING FOR COVID-19: ICD-10-CM

## 2021-08-23 DIAGNOSIS — Z90.79 ACQUIRED ABSENCE OF OTHER GENITAL ORGAN(S): Chronic | ICD-10-CM

## 2021-08-23 DIAGNOSIS — Z98.891 HISTORY OF UTERINE SCAR FROM PREVIOUS SURGERY: Chronic | ICD-10-CM

## 2021-08-23 LAB — SARS-COV-2 RNA SPEC QL NAA+PROBE: SIGNIFICANT CHANGE UP

## 2021-08-23 PROCEDURE — C9803: CPT

## 2021-08-23 PROCEDURE — U0003: CPT

## 2021-08-23 PROCEDURE — U0005: CPT

## 2021-08-24 ENCOUNTER — INPATIENT (INPATIENT)
Facility: HOSPITAL | Age: 36
LOS: 1 days | Discharge: HOME | End: 2021-08-26
Payer: MEDICAID

## 2021-08-24 VITALS
HEART RATE: 88 BPM | RESPIRATION RATE: 18 BRPM | SYSTOLIC BLOOD PRESSURE: 118 MMHG | TEMPERATURE: 98 F | DIASTOLIC BLOOD PRESSURE: 70 MMHG

## 2021-08-24 DIAGNOSIS — O34.211 MATERNAL CARE FOR LOW TRANSVERSE SCAR FROM PREVIOUS CESAREAN DELIVERY: ICD-10-CM

## 2021-08-24 DIAGNOSIS — Z90.79 ACQUIRED ABSENCE OF OTHER GENITAL ORGAN(S): Chronic | ICD-10-CM

## 2021-08-24 DIAGNOSIS — Z98.891 HISTORY OF UTERINE SCAR FROM PREVIOUS SURGERY: Chronic | ICD-10-CM

## 2021-08-24 PROBLEM — G43.909 MIGRAINE, UNSPECIFIED, NOT INTRACTABLE, WITHOUT STATUS MIGRAINOSUS: Chronic | Status: ACTIVE | Noted: 2021-08-20

## 2021-08-24 PROBLEM — J45.909 UNSPECIFIED ASTHMA, UNCOMPLICATED: Chronic | Status: ACTIVE | Noted: 2021-08-20

## 2021-08-24 LAB
BLD GP AB SCN SERPL QL: NEGATIVE — SIGNIFICANT CHANGE UP
COVID-19 SPIKE DOMAIN AB INTERP: NEGATIVE — SIGNIFICANT CHANGE UP
COVID-19 SPIKE DOMAIN ANTIBODY RESULT: 0.4 U/ML — SIGNIFICANT CHANGE UP
RH IG SCN BLD-IMP: POSITIVE — SIGNIFICANT CHANGE UP
SARS-COV-2 IGG+IGM SERPL QL IA: 0.4 U/ML — SIGNIFICANT CHANGE UP
SARS-COV-2 IGG+IGM SERPL QL IA: NEGATIVE — SIGNIFICANT CHANGE UP

## 2021-08-24 RX ORDER — OXYCODONE HYDROCHLORIDE 5 MG/1
5 TABLET ORAL
Refills: 0 | Status: COMPLETED | OUTPATIENT
Start: 2021-08-24 | End: 2021-08-31

## 2021-08-24 RX ORDER — FOLIC ACID 0.8 MG
1 TABLET ORAL DAILY
Refills: 0 | Status: DISCONTINUED | OUTPATIENT
Start: 2021-08-24 | End: 2021-08-26

## 2021-08-24 RX ORDER — OXYCODONE HYDROCHLORIDE 5 MG/1
5 TABLET ORAL
Refills: 0 | Status: DISCONTINUED | OUTPATIENT
Start: 2021-08-24 | End: 2021-08-24

## 2021-08-24 RX ORDER — DEXAMETHASONE 0.5 MG/5ML
4 ELIXIR ORAL EVERY 6 HOURS
Refills: 0 | Status: DISCONTINUED | OUTPATIENT
Start: 2021-08-24 | End: 2021-08-26

## 2021-08-24 RX ORDER — SIMETHICONE 80 MG/1
80 TABLET, CHEWABLE ORAL EVERY 4 HOURS
Refills: 0 | Status: DISCONTINUED | OUTPATIENT
Start: 2021-08-24 | End: 2021-08-26

## 2021-08-24 RX ORDER — MORPHINE SULFATE 50 MG/1
0.1 CAPSULE, EXTENDED RELEASE ORAL ONCE
Refills: 0 | Status: DISCONTINUED | OUTPATIENT
Start: 2021-08-24 | End: 2021-08-25

## 2021-08-24 RX ORDER — OXYCODONE HYDROCHLORIDE 5 MG/1
5 TABLET ORAL ONCE
Refills: 0 | Status: DISCONTINUED | OUTPATIENT
Start: 2021-08-24 | End: 2021-08-26

## 2021-08-24 RX ORDER — ACETAMINOPHEN 500 MG
975 TABLET ORAL
Refills: 0 | Status: DISCONTINUED | OUTPATIENT
Start: 2021-08-24 | End: 2021-08-26

## 2021-08-24 RX ORDER — SODIUM CHLORIDE 9 MG/ML
1000 INJECTION, SOLUTION INTRAVENOUS
Refills: 0 | Status: DISCONTINUED | OUTPATIENT
Start: 2021-08-24 | End: 2021-08-26

## 2021-08-24 RX ORDER — NALOXONE HYDROCHLORIDE 4 MG/.1ML
0.1 SPRAY NASAL
Refills: 0 | Status: DISCONTINUED | OUTPATIENT
Start: 2021-08-24 | End: 2021-08-26

## 2021-08-24 RX ORDER — ACETAMINOPHEN 500 MG
1000 TABLET ORAL ONCE
Refills: 0 | Status: COMPLETED | OUTPATIENT
Start: 2021-08-24 | End: 2021-08-24

## 2021-08-24 RX ORDER — DIPHENHYDRAMINE HCL 50 MG
12.5 CAPSULE ORAL ONCE
Refills: 0 | Status: COMPLETED | OUTPATIENT
Start: 2021-08-24 | End: 2021-08-24

## 2021-08-24 RX ORDER — DIPHENHYDRAMINE HCL 50 MG
25 CAPSULE ORAL EVERY 4 HOURS
Refills: 0 | Status: DISCONTINUED | OUTPATIENT
Start: 2021-08-24 | End: 2021-08-26

## 2021-08-24 RX ORDER — KETOROLAC TROMETHAMINE 30 MG/ML
30 SYRINGE (ML) INJECTION EVERY 6 HOURS
Refills: 0 | Status: DISCONTINUED | OUTPATIENT
Start: 2021-08-24 | End: 2021-08-25

## 2021-08-24 RX ORDER — FERROUS SULFATE 325(65) MG
325 TABLET ORAL DAILY
Refills: 0 | Status: DISCONTINUED | OUTPATIENT
Start: 2021-08-24 | End: 2021-08-26

## 2021-08-24 RX ORDER — TETANUS TOXOID, REDUCED DIPHTHERIA TOXOID AND ACELLULAR PERTUSSIS VACCINE, ADSORBED 5; 2.5; 8; 8; 2.5 [IU]/.5ML; [IU]/.5ML; UG/.5ML; UG/.5ML; UG/.5ML
0.5 SUSPENSION INTRAMUSCULAR ONCE
Refills: 0 | Status: DISCONTINUED | OUTPATIENT
Start: 2021-08-24 | End: 2021-08-26

## 2021-08-24 RX ORDER — OXYTOCIN 10 UNIT/ML
333.33 VIAL (ML) INJECTION
Qty: 20 | Refills: 0 | Status: DISCONTINUED | OUTPATIENT
Start: 2021-08-24 | End: 2021-08-26

## 2021-08-24 RX ORDER — FAMOTIDINE 10 MG/ML
20 INJECTION INTRAVENOUS ONCE
Refills: 0 | Status: DISCONTINUED | OUTPATIENT
Start: 2021-08-24 | End: 2021-08-26

## 2021-08-24 RX ORDER — DIPHENHYDRAMINE HCL 50 MG
12.5 CAPSULE ORAL ONCE
Refills: 0 | Status: DISCONTINUED | OUTPATIENT
Start: 2021-08-24 | End: 2021-08-24

## 2021-08-24 RX ORDER — MAGNESIUM HYDROXIDE 400 MG/1
30 TABLET, CHEWABLE ORAL
Refills: 0 | Status: DISCONTINUED | OUTPATIENT
Start: 2021-08-24 | End: 2021-08-26

## 2021-08-24 RX ORDER — MONTELUKAST 4 MG/1
10 TABLET, CHEWABLE ORAL AT BEDTIME
Refills: 0 | Status: DISCONTINUED | OUTPATIENT
Start: 2021-08-24 | End: 2021-08-26

## 2021-08-24 RX ORDER — NALBUPHINE HYDROCHLORIDE 10 MG/ML
2.5 INJECTION, SOLUTION INTRAMUSCULAR; INTRAVENOUS; SUBCUTANEOUS EVERY 6 HOURS
Refills: 0 | Status: DISCONTINUED | OUTPATIENT
Start: 2021-08-24 | End: 2021-08-26

## 2021-08-24 RX ORDER — HEPARIN SODIUM 5000 [USP'U]/ML
5000 INJECTION INTRAVENOUS; SUBCUTANEOUS EVERY 12 HOURS
Refills: 0 | Status: DISCONTINUED | OUTPATIENT
Start: 2021-08-24 | End: 2021-08-26

## 2021-08-24 RX ORDER — LANOLIN
1 OINTMENT (GRAM) TOPICAL EVERY 6 HOURS
Refills: 0 | Status: DISCONTINUED | OUTPATIENT
Start: 2021-08-24 | End: 2021-08-26

## 2021-08-24 RX ORDER — DIPHENHYDRAMINE HCL 50 MG
25 CAPSULE ORAL EVERY 6 HOURS
Refills: 0 | Status: DISCONTINUED | OUTPATIENT
Start: 2021-08-24 | End: 2021-08-26

## 2021-08-24 RX ORDER — IBUPROFEN 200 MG
600 TABLET ORAL EVERY 6 HOURS
Refills: 0 | Status: COMPLETED | OUTPATIENT
Start: 2021-08-24 | End: 2022-07-23

## 2021-08-24 RX ORDER — ALBUTEROL 90 UG/1
2 AEROSOL, METERED ORAL EVERY 6 HOURS
Refills: 0 | Status: DISCONTINUED | OUTPATIENT
Start: 2021-08-24 | End: 2021-08-26

## 2021-08-24 RX ORDER — METOCLOPRAMIDE HCL 10 MG
10 TABLET ORAL ONCE
Refills: 0 | Status: COMPLETED | OUTPATIENT
Start: 2021-08-24 | End: 2021-08-24

## 2021-08-24 RX ORDER — ONDANSETRON 8 MG/1
4 TABLET, FILM COATED ORAL EVERY 6 HOURS
Refills: 0 | Status: DISCONTINUED | OUTPATIENT
Start: 2021-08-24 | End: 2021-08-26

## 2021-08-24 RX ADMIN — FAMOTIDINE 20 MILLIGRAM(S): 10 INJECTION INTRAVENOUS at 17:00

## 2021-08-24 RX ADMIN — SODIUM CHLORIDE 2000 MILLILITER(S): 9 INJECTION, SOLUTION INTRAVENOUS at 16:59

## 2021-08-24 RX ADMIN — Medication 400 MILLIGRAM(S): at 23:08

## 2021-08-24 RX ADMIN — SODIUM CHLORIDE 125 MILLILITER(S): 9 INJECTION, SOLUTION INTRAVENOUS at 16:59

## 2021-08-24 RX ADMIN — Medication 10 MILLIGRAM(S): at 21:33

## 2021-08-24 RX ADMIN — Medication 12.5 MILLIGRAM(S): at 22:37

## 2021-08-24 RX ADMIN — CHLORHEXIDINE GLUCONATE 1 APPLICATION(S): 213 SOLUTION TOPICAL at 17:00

## 2021-08-24 RX ADMIN — Medication 15 MILLILITER(S): at 17:00

## 2021-08-24 NOTE — OB RN INTRAOPERATIVE NOTE - NSSELHIDDEN_OBGYN_ALL_OB_FT
[NS_DeliveryRN_OBGYN_ALL_OB_FT:[id number]] [NS_DeliveryRN_OBGYN_ALL_OB_FT:[id number]==],[NS_CirculateRN2_OBGYN_ALL_OB_FT:[id number]=]

## 2021-08-24 NOTE — OB NEONATOLOGY/PEDIATRICIAN DELIVERY SUMMARY - NSPEDSNEONOTESA_OBGYN_ALL_OB_FT
39w2d GA infant delivered by scheduled  to a 34 y/o  O+ mother with unremarkable pregnancy. Medical hx significant for asthma for which she takes albuterol and singulair and migraine (no meds). Prenatal labs : GBS neg (), HIV neg, RPR non-reactive, HBsAg neg, rubella immune. COVID neg. Mother started to have contractions today, no ROM. Maternal Tmax 36.8. AROM at the time of delivery to clear fluid. Infant delivered cephalic and vacuum assisted. Nuchal cord x 1. Infant born with spontaneous cry and good tone. Dried, positioned and bulb suctioned. Apgars 8/9. Mother plans to breastfeed and would like her infant circumcised. EOS 39w2d GA infant delivered by scheduled  to a 34 y/o  O+ mother with unremarkable pregnancy. Medical hx significant for asthma for which she takes albuterol and singulair and migraine (no meds). Prenatal labs : GBS neg (), HIV neg, RPR non-reactive, HBsAg neg, rubella immune. COVID neg. Mother started to have contractions today, no ROM. Maternal Tmax 36.8. AROM at the time of delivery to clear fluid. Infant delivered cephalic and vacuum assisted. Nuchal cord x 1. Infant born with spontaneous cry and good tone. Dried, positioned and bulb suctioned. Apgars 8/9. Mother plans to breastfeed and would like her infant circumcised. EOS 0.03

## 2021-08-24 NOTE — PRE-ANESTHESIA EVALUATION ADULT - NSANTHTIREDRD_ENT_A_CORE
Behavioral Health Consultation  Emily Braxton, Ph.D.  Psychologist  2020    Time spent with Patient: 30 minutes  This is patient'ssecond [facility] appointment     Patient consented  to Telehealth program, therefore she was seen today via this modality.   I contacted patient by listing telephone number in the invitation provided by the [employer] platform. Surveying the environment during the session, patient's was at home.      Reason for Consult: distress due to  divorce. Referring Provider: Dr. William Gottlieb.     Feedback given to PCP. .   :   S:   Patient reported that her supervisor only allowed her to be excused from work for a medical appointment once a month. She indicated that the company is letting employees go and therefore they are short of staff . Session focused on her relationship with daughters  Jonna Bullock and [facility] to whom she is emotionally distant. Patient hopes to mend issues with [facility] triggered by patient's divorce from [facility] father. We reflected on elements of their conflict affecting other family members. As her support system, patient mentioned two sisters residing in [facility] and [facility]. Patient's goal is to be debt free and  feels proud of moving closer to her goal. Otherwise she is planning to celebrate Fermin with her daughter Jonna Bullock , son in law and her two adult granddaughters. Patient is grieving the losses of her mother who  in 2019 and mother-in-law, who  a year a year and ahalf ago.      O:   MSE:   Appearance alert, cooperative, moderate distress   Appetite normal   Sleep disturbance Yes   Fatigue Yes   Loss of pleasure Yes   Impulsive behavior No   Speech spontaneous, normal rate, normal volume and well articulated   Mood Depressed   Affect depressed affect   Thought Content intact   Thought Process coherent   Associations logical connections   Insight Good   Judgment Intact   Orientation oriented to person, place, time, and general circumstances Memory recent and remote memory intact   Attention/Concentration intact   Morbid ideation No   Suicide Assessment no suicidal ideation       PHQ Scores 12/5/2020 11/12/2020 7/17/2020 4/4/2019 7/9/2018   PHQ2 Score 3 2 0 0 0   PHQ9 Score 12 2 0 0 0     Interpretation of Total Score Depression Severity:   1-4 = Minimal depression, 5-9 = Mild depression, 10-14 = Moderate depression, 15-19 = Moderately severe depression, 20-27 = Severe depression   CARLIE 7 SCORE 12/4/2020 12/5/2020   CARLIE-7 Total Score 2 2   CARLIE-7 score interpretation:   0-4 Subclinical, 5-9 Mild, 10-14 Moderate, 15-21 Severe   History:   Medications:   Current Facility-Administered Medications          Current Outpatient Medications   Medication Sig Dispense Refill    PARoxetine (PAXIL CR) 25 MG extended release tablet Take 1 tablet by mouth daily 30 tablet 3    aspirin 81 MG tablet Take 1 tablet by mouth daily 90 tablet 0    apixaban (ELIQUIS) 5 MG TABS tablet Take 1 tablet by mouth 2 times daily 60 tablet 5    atorvastatin (LIPITOR) 10 MG tablet Take 1 tablet by mouth daily 90 tablet 1    verapamil (CALAN) 120 MG tablet Take 1 tablet by mouth daily 90 tablet 1    metoprolol tartrate (LOPRESSOR) 50 MG tablet Take 1 tablet by mouth 2 times daily 180 tablet 1    metFORMIN (GLUCOPHAGE) 500 MG tablet Take 1 tablet by mouth daily 90 tablet 1    loratadine (CLARITIN) 10 MG tablet Take 10 mg by mouth daily      acetaminophen (TYLENOL) 325 MG tablet Take 650 mg by mouth every 6 hours as needed for Pain      diphenhydrAMINE (BENADRYL) 25 MG tablet Take 25 mg by mouth every 6 hours as needed for Itching      melatonin 5 MG TABS tablet Take 5 mg by mouth daily     No current facility-administered medications for this visit.      Social History:   Social History         Socioeconomic History    Marital status: Single     Spouse name: Not on file    Number of children: Not on file    Years of education: Not on file  Highest education level: Not on file   Occupational History    Not on file   Social Needs    Financial resource strain: Not on file    Food insecurity     Worry: Not on file     Inability: Not on file    Transportation needs     Medical: Not on file     Non-medical: Not on file   Tobacco Use    Smoking status: Former Smoker     Packs/day: 0.25     Years: 5.00     Pack years: 1.25     Types: Cigarettes     Last attempt to quit: 8/3/2014     Years since quittin.3    Smokeless tobacco: Never Used    Tobacco comment: quit for long period    Substance and Sexual Activity    Alcohol use: No     Comment: NOT SINCE 2017    Drug use: No    Sexual activity: Not on file   Lifestyle    Physical activity     Days per week: Not on file     Minutes per session: Not on file    Stress: Not on file   Relationships    Social connections     Talks on phone: Not on file     Gets together: Not on file     Attends [employer] service: Not on file     Active member of club or organization: Not on file     Attends meetings of clubs or organizations: Not on file     Relationship status: Not on file    Intimate partner violence     Fear of current or ex partner: Not on file     Emotionally abused: Not on file     Physically abused: Not on file     Forced sexual activity: Not on file   Other Topics Concern    Not on file   Social History Narrative    Not on file   TOBACCO: reports that she quit smoking about 6 years ago. Her smoking use included cigarettes. She has a 1.25 pack-year smoking history. She has never used smokeless tobacco.   ETOH: reports no history of alcohol use.    Family History:   Family History         Family History   Problem Relation Age of Onset    Stroke Other     Cancer Other     Pancreatic cancer    Atrial Fibrillation Mother     Dementia Mother     Cancer Father     Emphysema Father      A: Patient' is dealing with a moderate level of depression and subclinical anxiety. She denied suicidallity and tries to alleviate loneliness by distracting watching old movies. Diagnosis:   Adjustment Disorder with Depressed Mood. F43.21. Plan:  Pt interventions:  Discussed prevalence of  he development of sadness and loneliness triggered by divorce for general population,. Trained in strategies for increasing balanced thinking, Maintained  rapport. Supportive techniques        Pt Behavioral Change Plan:     Session in a week. Pt Behavioral Change Plan:  Session in a week. No

## 2021-08-24 NOTE — OB RN PREOPERATIVE CHECKLIST - AICD PRESENT
Name: Nay Hidalgo ADMIT: 2017   : 1945  PCP: Natalia Colon MD    MRN: [id number] LOS: 1 days   AGE/SEX: 72 y.o. female  ROOM: [facility]     Referring Provider: Dr. Fernandes  no

## 2021-08-24 NOTE — OB PROVIDER H&P - ASSESSMENT
34yo  at 39w2d (MELVIN ) presenting for scheduled rLTCS.    - NPO  - anesthesia consult  - NST  - scheduled rLTCS  - routine labs  - routine pre-op meds    plan per Dr. Ari Garcia PGY2

## 2021-08-24 NOTE — OB PROVIDER H&P - NSHPPHYSICALEXAM_GEN_ALL_CORE
Vital Signs Last 24 Hrs  T(C): --  T(F): --  HR: 88 (24 Aug 2021 15:05) (86 - 88)  BP: 118/70 (24 Aug 2021 14:56) (118/70 - 118/70)  BP(mean): --  RR: --  SpO2: 100% (24 Aug 2021 15:05) (100% - 100%)    Gen NAD  CV Regular  Pulm Comfortable on RA  Abd soft nontender  Ext nontender b/l  , mod, +accel, -decel  [place]: q5-7 min ctx

## 2021-08-24 NOTE — OB PROVIDER H&P - NSHPREVIEWOFSYSTEMS_GEN_ALL_CORE
Pt reports ctx started this morning and she is unsure what time but they are regular now. Denies LOF, VB and dec FM. Denies fevers, headaches, CP, SOB, abdominal pain and edema

## 2021-08-24 NOTE — OB PROVIDER H&P - HISTORY OF PRESENT ILLNESS
36yo  at 39w2d (MELVIN ) presenting for scheduled rLTCS. Pt reports ctx started this morning and she is unsure what time but they are regular now. Denies LOF, VB and dec FM. Denies fevers, headaches, CP, SOB, abdominal pain and edema     PNC: GBS unknown, EFW 7# 3 weeks ago  POB: 2012 SAB  2016 pLTCS for [facility] (8#7)  2017 left ectopic s/p salpingectomy   2018 rLTCS (8#1)  GYN Denies fibroids, cysts, ab paps, STIs  PMH: Asthma (never intubated, last hospitalized in ). H/o Migraines  PSH: LTCS x2, [facility] left salpingectomy, Right breast Bx   All: NKDA  Meds: Singular, Albuterol prn, PNV 34yo  at 39w2d (MELVIN ) presenting for scheduled rLTCS. Pt reports ctx started this morning and she is unsure what time but they are regular now. Denies LOF, VB and dec FM. Denies fevers, headaches, CP, SOB, abdominal pain and edema     PNC: GBS unknown, EFW 7# 3 weeks ago  POB: 2012  2016 pLTCS for [facility] (8#7)  2017 left ectopic s/p salpingectomy   2018 rLTCS (8#1)  GYN Denies fibroids, cysts, ab paps, STIs  PMH: Asthma (never intubated, last hospitalized in ). H/o Migraines  PSH: LTCS x2, Lsc left salpingectomy, Right breast Bx   All: NKDA  Meds: Singular, Albuterol prn, PNV  Shx: Denies toxic habits, anxiety and depression

## 2021-08-24 NOTE — OB PROVIDER DELIVERY SUMMARY - NSSELHIDDEN_OBGYN_ALL_OB_FT
[NS_DeliveryRN_OBGYN_ALL_OB_FT:[id number]],[NS_DeliveryAttending1_OBGYN_ALL_OB_FT:[id number]]

## 2021-08-24 NOTE — OB PROVIDER DELIVERY SUMMARY - NSPROVIDERDELIVERYNOTE_OBGYN_ALL_OB_FT
Pt was delivered of a viable baby boy via vertex with assistance of vacuum. Apgars 9/9 and weight of 7' 11"(3505 grams) loose cord around the neck.   placenta was intact with three vessel cord   ml  FI 1300 ml   ml  Ancef 2 grams  methylene blue 250 instilled into bladder-no leakage noted

## 2021-08-24 NOTE — OB RN DELIVERY SUMMARY - NS_SEPSISRSKCALC_OBGYN_ALL_OB_FT
EOS calculated successfully. EOS Risk Factor: 0.5/1000 live births ([facility] national incidence); GA=39w2d; Temp=98.1; ROM=0.017; GBS='Negative'; Antibiotics='No antibiotics or any antibiotics < 2 hrs prior to birth'

## 2021-08-25 LAB — T PALLIDUM AB TITR SER: NEGATIVE — SIGNIFICANT CHANGE UP

## 2021-08-25 RX ORDER — IBUPROFEN 200 MG
600 TABLET ORAL EVERY 6 HOURS
Refills: 0 | Status: DISCONTINUED | OUTPATIENT
Start: 2021-08-25 | End: 2021-08-26

## 2021-08-25 RX ADMIN — HEPARIN SODIUM 5000 UNIT(S): 5000 INJECTION INTRAVENOUS; SUBCUTANEOUS at 05:52

## 2021-08-25 RX ADMIN — Medication 600 MILLIGRAM(S): at 22:23

## 2021-08-25 RX ADMIN — Medication 30 MILLIGRAM(S): at 15:16

## 2021-08-25 RX ADMIN — NALBUPHINE HYDROCHLORIDE 2.5 MILLIGRAM(S): 10 INJECTION, SOLUTION INTRAMUSCULAR; INTRAVENOUS; SUBCUTANEOUS at 01:05

## 2021-08-25 RX ADMIN — Medication 1 TABLET(S): at 12:02

## 2021-08-25 RX ADMIN — Medication 325 MILLIGRAM(S): at 12:02

## 2021-08-25 RX ADMIN — Medication 975 MILLIGRAM(S): at 05:52

## 2021-08-25 RX ADMIN — ALBUTEROL 2 PUFF(S): 90 AEROSOL, METERED ORAL at 02:04

## 2021-08-25 RX ADMIN — Medication 975 MILLIGRAM(S): at 06:17

## 2021-08-25 RX ADMIN — MONTELUKAST 10 MILLIGRAM(S): 4 TABLET, CHEWABLE ORAL at 22:11

## 2021-08-25 RX ADMIN — Medication 30 MILLIGRAM(S): at 01:42

## 2021-08-25 RX ADMIN — Medication 975 MILLIGRAM(S): at 18:35

## 2021-08-25 RX ADMIN — MONTELUKAST 10 MILLIGRAM(S): 4 TABLET, CHEWABLE ORAL at 01:49

## 2021-08-25 RX ADMIN — Medication 600 MILLIGRAM(S): at 21:22

## 2021-08-25 RX ADMIN — SIMETHICONE 80 MILLIGRAM(S): 80 TABLET, CHEWABLE ORAL at 12:06

## 2021-08-25 RX ADMIN — Medication 30 MILLIGRAM(S): at 09:06

## 2021-08-25 RX ADMIN — Medication 1 MILLIGRAM(S): at 12:06

## 2021-08-25 RX ADMIN — NALBUPHINE HYDROCHLORIDE 2.5 MILLIGRAM(S): 10 INJECTION, SOLUTION INTRAMUSCULAR; INTRAVENOUS; SUBCUTANEOUS at 02:00

## 2021-08-25 RX ADMIN — Medication 30 MILLIGRAM(S): at 09:40

## 2021-08-25 RX ADMIN — Medication 30 MILLIGRAM(S): at 02:15

## 2021-08-25 RX ADMIN — HEPARIN SODIUM 5000 UNIT(S): 5000 INJECTION INTRAVENOUS; SUBCUTANEOUS at 18:35

## 2021-08-25 RX ADMIN — Medication 975 MILLIGRAM(S): at 12:03

## 2021-08-25 RX ADMIN — Medication 975 MILLIGRAM(S): at 12:33

## 2021-08-25 NOTE — PROGRESS NOTE ADULT - SUBJECTIVE AND OBJECTIVE BOX
Section/Postpartum    CARLOS SIMMONS is a  35y woman     , who is now post-operative day: 1    PAST MEDICAL & SURGICAL HISTORY:  Asthma  mild - deneis exacerbation    Migraine    H/O  section  2016, 2018    H/O unilateral salpingectomy  2017        Subjective:  The patient feels well.  She is ambulating.   She is tolerating regular diet.  She denies nausea and vomiting.  She is voiding.  Her pain is controlled.  She reports normal postpartum bleeding.  She is breastfeeding.  She is formula feeding.    Physical exam:    Vital Signs Last 24 Hrs  T(C): 36.8 (25 Aug 2021 09:40), Max: 36.8 (25 Aug 2021 09:40)  T(F): 98.2 (25 Aug 2021 09:40), Max: 98.2 (25 Aug 2021 09:40)  HR: 72 (25 Aug 2021 09:40) (52 - 116)  BP: 99/60 (25 Aug 2021 09:40) (97/52 - 126/68)  BP(mean): 77 (24 Aug 2021 23:10) (70 - 92)  RR: 18 (25 Aug 2021 09:40) (14 - 20)  SpO2: 99% (25 Aug 2021 09:40) (83% - 100%)    General: alert and oriented in no acute distress.  Breast: Soft, nontender, not engorged.  Abdomen: Soft, nontender, not distended ,  Uterine fundus is firm and at below the umbilicus, BS (+), Flatus (+), Bowel Movement (+)  Incision: Clean, dry, and intact, bandage has been removed  Pelvic: Normal lochia noted  Ext: No calf tenderness  Lochia: not excessive      LABS:      Rubella status: Immune     Blood Type:  ABO Interpretation: O ( @ 15:12)     Rh Interpretation: Positive ( @ 15:12)                   Allergies    No Known Drug Allergies  seasonal allergy (Short breath)    Intolerances        MEDICATIONS  (STANDING):  acetaminophen   Tablet .. 975 milliGRAM(s) Oral <User Schedule>  ceFAZolin   IVPB 2000 milliGRAM(s) IV Intermittent once  diphtheria/tetanus/pertussis (acellular) Vaccine (ADAcel) 0.5 milliLiter(s) IntraMuscular once  famotidine Injectable 20 milliGRAM(s) IV Push once  ferrous    sulfate 325 milliGRAM(s) Oral daily  folic acid 1 milliGRAM(s) Oral daily  heparin   Injectable 5000 Unit(s) SubCutaneous every 12 hours  ibuprofen  Tablet. 600 milliGRAM(s) Oral every 6 hours  ketorolac   Injectable 30 milliGRAM(s) IV Push every 6 hours  lactated ringers. 1000 milliLiter(s) (75 mL/Hr) IV Continuous <Continuous>  montelukast 10 milliGRAM(s) Oral at bedtime  morphine PF Spinal 0.1 milliGRAM(s) IntraThecal. once  oxytocin Infusion 333.333 milliUNIT(s)/Min (1000 mL/Hr) IV Continuous <Continuous>  oxytocin Infusion 333.333 milliUNIT(s)/Min (1000 mL/Hr) IV Continuous <Continuous>  prenatal multivitamin 1 Tablet(s) Oral daily    MEDICATIONS  (PRN):  ALBUTerol    90 MICROgram(s) HFA Inhaler 2 Puff(s) Inhalation every 6 hours PRN Shortness of Breath and/or Wheezing  dexAMETHasone  Injectable 4 milliGRAM(s) IV Push every 6 hours PRN Nausea  diphenhydrAMINE 25 milliGRAM(s) Oral every 4 hours PRN Pruritus  diphenhydrAMINE 25 milliGRAM(s) Oral every 6 hours PRN Pruritus  lanolin Ointment 1 Application(s) Topical every 6 hours PRN Sore Nipples  magnesium hydroxide Suspension 30 milliLiter(s) Oral two times a day PRN Constipation  nalbuphine Injectable 2.5 milliGRAM(s) IV Push every 6 hours PRN Pruritus  naloxone Injectable 0.1 milliGRAM(s) IV Push every 3 minutes PRN For ANY of the following changes in patient status:  A. Breaths Per Minute LESS THAN 10, B. Oxygen saturation LESS THAN 90%, C. Sedation score of 6 for Stop After: 4 Times  ondansetron Injectable 4 milliGRAM(s) IV Push every 6 hours PRN Nausea  oxyCODONE    IR 5 milliGRAM(s) Oral every 3 hours PRN Mild Pain (1 - 3)  oxyCODONE    IR 5 milliGRAM(s) Oral every 3 hours PRN Moderate to Severe Pain (4-10)  oxyCODONE    IR 5 milliGRAM(s) Oral once PRN Moderate to Severe Pain (4-10)  simethicone 80 milliGRAM(s) Chew every 4 hours PRN Gas        Assessment and Plan:    POD #  1 s/p  RCS       Doing well.  Encourage ambulation, may shower, routine postop care.  Circumcision today.

## 2021-08-25 NOTE — PROGRESS NOTE ADULT - SUBJECTIVE AND OBJECTIVE BOX
Day 1 of Anesthesia Pain Management Service    SUBJECTIVE:  Pain Scale Score:          [X] Refer to charted pain scores    THERAPY:    s/p neuraxial PF morphine    MEDICATIONS  (STANDING):  acetaminophen   Tablet .. 975 milliGRAM(s) Oral <User Schedule>  ceFAZolin   IVPB 2000 milliGRAM(s) IV Intermittent once  diphtheria/tetanus/pertussis (acellular) Vaccine (ADAcel) 0.5 milliLiter(s) IntraMuscular once  famotidine Injectable 20 milliGRAM(s) IV Push once  ferrous    sulfate 325 milliGRAM(s) Oral daily  folic acid 1 milliGRAM(s) Oral daily  heparin   Injectable 5000 Unit(s) SubCutaneous every 12 hours  ibuprofen  Tablet. 600 milliGRAM(s) Oral every 6 hours  ketorolac   Injectable 30 milliGRAM(s) IV Push every 6 hours  lactated ringers. 1000 milliLiter(s) (75 mL/Hr) IV Continuous <Continuous>  montelukast 10 milliGRAM(s) Oral at bedtime  morphine PF Spinal 0.1 milliGRAM(s) IntraThecal. once  oxytocin Infusion 333.333 milliUNIT(s)/Min (1000 mL/Hr) IV Continuous <Continuous>  oxytocin Infusion 333.333 milliUNIT(s)/Min (1000 mL/Hr) IV Continuous <Continuous>  prenatal multivitamin 1 Tablet(s) Oral daily    MEDICATIONS  (PRN):  ALBUTerol    90 MICROgram(s) HFA Inhaler 2 Puff(s) Inhalation every 6 hours PRN Shortness of Breath and/or Wheezing  dexAMETHasone  Injectable 4 milliGRAM(s) IV Push every 6 hours PRN Nausea  diphenhydrAMINE 25 milliGRAM(s) Oral every 4 hours PRN Pruritus  diphenhydrAMINE 25 milliGRAM(s) Oral every 6 hours PRN Pruritus  lanolin Ointment 1 Application(s) Topical every 6 hours PRN Sore Nipples  magnesium hydroxide Suspension 30 milliLiter(s) Oral two times a day PRN Constipation  nalbuphine Injectable 2.5 milliGRAM(s) IV Push every 6 hours PRN Pruritus  naloxone Injectable 0.1 milliGRAM(s) IV Push every 3 minutes PRN For ANY of the following changes in patient status:  A. Breaths Per Minute LESS THAN 10, B. Oxygen saturation LESS THAN 90%, C. Sedation score of 6 for Stop After: 4 Times  ondansetron Injectable 4 milliGRAM(s) IV Push every 6 hours PRN Nausea  oxyCODONE    IR 5 milliGRAM(s) Oral every 3 hours PRN Mild Pain (1 - 3)  oxyCODONE    IR 5 milliGRAM(s) Oral every 3 hours PRN Moderate to Severe Pain (4-10)  oxyCODONE    IR 5 milliGRAM(s) Oral once PRN Moderate to Severe Pain (4-10)  simethicone 80 milliGRAM(s) Chew every 4 hours PRN Gas      OBJECTIVE:    Sedation:        	[X] Alert	[ ] Drowsy	[ ] Arousable      [ ] Asleep       [ ] Unresponsive    Side Effects:	[] None	[ ] Nausea	[ ] Vomiting         [X] Pruritus - now resolved  		[ ] Weakness            [ ] Numbness	          [ ] Other:    Vital Signs Last 24 Hrs  T(C): 36.6 (25 Aug 2021 05:35), Max: 36.7 (24 Aug 2021 14:56)  T(F): 97.9 (25 Aug 2021 05:35), Max: 98.1 (24 Aug 2021 15:16)  HR: 68 (25 Aug 2021 05:35) (52 - 116)  BP: 102/64 (25 Aug 2021 05:35) (97/52 - 126/68)  BP(mean): 77 (24 Aug 2021 23:10) (70 - 92)  RR: 18 (25 Aug 2021 05:35) (14 - 20)  SpO2: 98% (25 Aug 2021 05:35) (83% - 100%)    ASSESSMENT/ PLAN  [X] Patient transitioned to prn analgesics  [X] Pain management per primary service, pain service to sign off   [X]Documentation and Verification of current medications

## 2021-08-25 NOTE — PROGRESS NOTE ADULT - SUBJECTIVE AND OBJECTIVE BOX
OB Progress Note:  Delivery, POD#1    S: 34yo POD#1 s/p LTCS. Her pain is somewhat controlled as she has a lot of pain with ambulation. She is tolerating a regular diet and passing flatus. Denies N/V. Denies CP/SOB/lightheadedness/dizziness. Patient still has Marsh in place and is due to have it removed soon.    O:   Vital Signs Last 24 Hrs  T(C): 36.6 (25 Aug 2021 01:31), Max: 36.7 (24 Aug 2021 14:56)  T(F): 97.9 (25 Aug 2021 01:31), Max: 98.1 (24 Aug 2021 15:16)  HR: 69 (25 Aug 2021 01:) (52 - 116)  BP: 114/65 (25 Aug 2021 01:00) (97/52 - 126/68)  BP(mean): 77 (24 Aug 2021 23:10) (70 - 92)  RR: 18 (25 Aug 2021 01:31) (14 - 20)  SpO2: 98% (25 Aug 2021 01:31) (83% - 100%)    Labs:  Blood type: O Positive  Rubella IgG: RPR: Negative        UOP: 325 in 5 hours        acetaminophen   Tablet .. 975 milliGRAM(s) Oral <User Schedule>  ALBUTerol    90 MICROgram(s) HFA Inhaler 2 Puff(s) Inhalation every 6 hours PRN  ceFAZolin   IVPB 2000 milliGRAM(s) IV Intermittent once  dexAMETHasone  Injectable 4 milliGRAM(s) IV Push every 6 hours PRN  diphenhydrAMINE 25 milliGRAM(s) Oral every 4 hours PRN  diphenhydrAMINE 25 milliGRAM(s) Oral every 6 hours PRN  diphtheria/tetanus/pertussis (acellular) Vaccine (ADAcel) 0.5 milliLiter(s) IntraMuscular once  famotidine Injectable 20 milliGRAM(s) IV Push once  ferrous    sulfate 325 milliGRAM(s) Oral daily  folic acid 1 milliGRAM(s) Oral daily  heparin   Injectable 5000 Unit(s) SubCutaneous every 12 hours  ibuprofen  Tablet. 600 milliGRAM(s) Oral every 6 hours  ketorolac   Injectable 30 milliGRAM(s) IV Push every 6 hours  lactated ringers. 1000 milliLiter(s) IV Continuous <Continuous>  lanolin Ointment 1 Application(s) Topical every 6 hours PRN  magnesium hydroxide Suspension 30 milliLiter(s) Oral two times a day PRN  montelukast 10 milliGRAM(s) Oral at bedtime  morphine PF Spinal 0.1 milliGRAM(s) IntraThecal. once  nalbuphine Injectable 2.5 milliGRAM(s) IV Push every 6 hours PRN  naloxone Injectable 0.1 milliGRAM(s) IV Push every 3 minutes PRN  ondansetron Injectable 4 milliGRAM(s) IV Push every 6 hours PRN  oxyCODONE    IR 5 milliGRAM(s) Oral every 3 hours PRN  oxyCODONE    IR 5 milliGRAM(s) Oral every 3 hours PRN  oxyCODONE    IR 5 milliGRAM(s) Oral once PRN  oxytocin Infusion 333.333 milliUNIT(s)/Min IV Continuous <Continuous>  oxytocin Infusion 333.333 milliUNIT(s)/Min IV Continuous <Continuous>  prenatal multivitamin 1 Tablet(s) Oral daily  simethicone 80 milliGRAM(s) Chew every 4 hours PRN      PE:  General: NAD  CV: RRR  Pulm: CTAB  Abdomen: Mildly distended, appropriately tender, incision c/d/i. Fundus firm @umbilicus  Extremities: No calf tenderness, no pitting edema

## 2021-08-26 ENCOUNTER — TRANSCRIPTION ENCOUNTER (OUTPATIENT)
Age: 36
End: 2021-08-26

## 2021-08-26 VITALS
RESPIRATION RATE: 18 BRPM | DIASTOLIC BLOOD PRESSURE: 60 MMHG | OXYGEN SATURATION: 98 % | HEART RATE: 71 BPM | SYSTOLIC BLOOD PRESSURE: 91 MMHG | TEMPERATURE: 98 F

## 2021-08-26 PROCEDURE — 86900 BLOOD TYPING SEROLOGIC ABO: CPT

## 2021-08-26 PROCEDURE — 86901 BLOOD TYPING SEROLOGIC RH(D): CPT

## 2021-08-26 PROCEDURE — 86850 RBC ANTIBODY SCREEN: CPT

## 2021-08-26 PROCEDURE — 59025 FETAL NON-STRESS TEST: CPT

## 2021-08-26 PROCEDURE — 86780 TREPONEMA PALLIDUM: CPT

## 2021-08-26 PROCEDURE — 59050 FETAL MONITOR W/REPORT: CPT

## 2021-08-26 PROCEDURE — 94640 AIRWAY INHALATION TREATMENT: CPT

## 2021-08-26 PROCEDURE — 86769 SARS-COV-2 COVID-19 ANTIBODY: CPT

## 2021-08-26 PROCEDURE — C1765: CPT

## 2021-08-26 RX ORDER — IBUPROFEN 200 MG
1 TABLET ORAL
Qty: 0 | Refills: 0 | DISCHARGE
Start: 2021-08-26

## 2021-08-26 RX ORDER — OXYCODONE HYDROCHLORIDE 5 MG/1
5 TABLET ORAL
Refills: 0 | Status: DISCONTINUED | OUTPATIENT
Start: 2021-08-26 | End: 2021-08-26

## 2021-08-26 RX ORDER — ACETAMINOPHEN 500 MG
3 TABLET ORAL
Qty: 0 | Refills: 0 | DISCHARGE
Start: 2021-08-26

## 2021-08-26 RX ORDER — OXYCODONE HYDROCHLORIDE 5 MG/1
1 TABLET ORAL
Qty: 12 | Refills: 0
Start: 2021-08-26 | End: 2021-08-28

## 2021-08-26 RX ORDER — MAGNESIUM HYDROXIDE 400 MG/1
30 TABLET, CHEWABLE ORAL
Qty: 0 | Refills: 0 | DISCHARGE
Start: 2021-08-26

## 2021-08-26 RX ADMIN — Medication 975 MILLIGRAM(S): at 01:00

## 2021-08-26 RX ADMIN — Medication 975 MILLIGRAM(S): at 12:19

## 2021-08-26 RX ADMIN — SIMETHICONE 80 MILLIGRAM(S): 80 TABLET, CHEWABLE ORAL at 03:38

## 2021-08-26 RX ADMIN — OXYCODONE HYDROCHLORIDE 5 MILLIGRAM(S): 5 TABLET ORAL at 06:40

## 2021-08-26 RX ADMIN — Medication 975 MILLIGRAM(S): at 12:50

## 2021-08-26 RX ADMIN — Medication 1 TABLET(S): at 12:20

## 2021-08-26 RX ADMIN — Medication 975 MILLIGRAM(S): at 00:11

## 2021-08-26 RX ADMIN — Medication 1 MILLIGRAM(S): at 12:19

## 2021-08-26 RX ADMIN — Medication 600 MILLIGRAM(S): at 09:06

## 2021-08-26 RX ADMIN — OXYCODONE HYDROCHLORIDE 5 MILLIGRAM(S): 5 TABLET ORAL at 06:06

## 2021-08-26 RX ADMIN — Medication 600 MILLIGRAM(S): at 09:40

## 2021-08-26 RX ADMIN — HEPARIN SODIUM 5000 UNIT(S): 5000 INJECTION INTRAVENOUS; SUBCUTANEOUS at 06:06

## 2021-08-26 RX ADMIN — Medication 600 MILLIGRAM(S): at 03:41

## 2021-08-26 RX ADMIN — Medication 975 MILLIGRAM(S): at 06:40

## 2021-08-26 RX ADMIN — Medication 325 MILLIGRAM(S): at 12:19

## 2021-08-26 RX ADMIN — Medication 600 MILLIGRAM(S): at 04:40

## 2021-08-26 RX ADMIN — Medication 975 MILLIGRAM(S): at 06:06

## 2021-08-26 NOTE — DISCHARGE NOTE OB - CARE PROVIDER_API CALL
Yaz Chapman)  Obstetrics and Gynecology  [address] [place] [address]  Phone: [phone]  Fax: [phone]  Established Patient  Follow Up Time: 1 week

## 2021-08-26 NOTE — DISCHARGE NOTE OB - MEDICATION SUMMARY - MEDICATIONS TO TAKE
I will START or STAY ON the medications listed below when I get home from the hospital:    acetaminophen 325 mg oral tablet  -- 3 tab(s) by mouth every 6 hours, donot exceed 4000 mg/day  -- Indication: For Pain    ibuprofen 200 mg oral tablet  -- 1 tab(s) by mouth every 6 hours  -- Indication: For Pain    oxyCODONE 5 mg oral tablet  -- 1 tab(s) by mouth every 6 hours, As Needed MDD:4 tabs/day   -- Indication: For sever pain    magnesium hydroxide 8% oral suspension  -- 30 milliliter(s) by mouth 2 times a day, As needed, Constipation  -- Indication: For constipation    Albuterol (Eqv-ProAir HFA) 90 mcg/inh inhalation aerosol  -- 2 puff(s) inhaled every 6 hours, As Needed  -- Indication: For asthma    Prenatal Multivitamins with Folic Acid 1 mg oral tablet  -- 1 tab(s) by mouth once a day  -- Indication: For vitamin    Singulair 10 mg oral tablet  -- 1 tab(s) by mouth once a day (at bedtime)  -- Indication: For asthma

## 2021-08-26 NOTE — DISCHARGE NOTE OB - CARE PLAN
1 Principal Discharge DX:	39 weeks gestation of pregnancy  Assessment and plan of treatment:	delivery  Secondary Diagnosis:	Previous  section  Assessment and plan of treatment:	repeat  section

## 2021-08-26 NOTE — PROGRESS NOTE ADULT - SUBJECTIVE AND OBJECTIVE BOX
Section/Postpartum    CARLOS SIMMONS is a  35y woman     , who is now post-operative day 2.    PAST MEDICAL & SURGICAL HISTORY:  Asthma  mild - deneis exacerbation    Migraine    H/O  section  2016, 2018    H/O unilateral salpingectomy  2017        Subjective:  The patient feels well.  She is ambulating without difficulty.  She is tolerating PO.  She is voiding.  She denies nausea and vomiting.  Her pain is controlled.  She reports normal postpartum bleeding  She is breastfeeding.  She is formula feeding.    Physical exam:    Vital Signs Last 24 Hrs  T(C): 36.7 (26 Aug 2021 06:10), Max: 36.9 (25 Aug 2021 13:48)  T(F): 98 (26 Aug 2021 06:10), Max: 98.4 (25 Aug 2021 13:48)  HR: 71 (26 Aug 2021 06:10) (71 - 76)  BP: 91/60 (26 Aug 2021 06:10) (91/60 - 100/61)  BP(mean): --  RR: 18 (26 Aug 2021 06:10) (18 - 18)  SpO2: 98% (26 Aug 2021 06:10) (98% - 99%)    General Apperance: alert and oriented in no acute distress  Breast: Soft, nontender, non engorged  Abdomen: Soft, nontender, not distended,  Uterine fundus is firm and below the umbilicus, BS(+), Flatus(+), Bowel Movement (+)  Incision: Clean, dry, and intact   Pelvic: Normal lochia noted  Ext: No calf tenderness, No edema or cyanosis  Lochia: not excessive    LABS:    Blood Type: ABO Interpretation: O   Rh Interpretation: Positive   Antibody Screen: Negative   Rubella status: Immune      Allergies    No Known Drug Allergies  seasonal allergy (Short breath)    Intolerances        MEDICATIONS  (STANDING):  acetaminophen   Tablet .. 975 milliGRAM(s) Oral <User Schedule>  ceFAZolin   IVPB 2000 milliGRAM(s) IV Intermittent once  diphtheria/tetanus/pertussis (acellular) Vaccine (ADAcel) 0.5 milliLiter(s) IntraMuscular once  famotidine Injectable 20 milliGRAM(s) IV Push once  ferrous    sulfate 325 milliGRAM(s) Oral daily  folic acid 1 milliGRAM(s) Oral daily  heparin   Injectable 5000 Unit(s) SubCutaneous every 12 hours  ibuprofen  Tablet. 600 milliGRAM(s) Oral every 6 hours  lactated ringers. 1000 milliLiter(s) (75 mL/Hr) IV Continuous <Continuous>  montelukast 10 milliGRAM(s) Oral at bedtime  oxytocin Infusion 333.333 milliUNIT(s)/Min (1000 mL/Hr) IV Continuous <Continuous>  oxytocin Infusion 333.333 milliUNIT(s)/Min (1000 mL/Hr) IV Continuous <Continuous>  prenatal multivitamin 1 Tablet(s) Oral daily    MEDICATIONS  (PRN):  ALBUTerol    90 MICROgram(s) HFA Inhaler 2 Puff(s) Inhalation every 6 hours PRN Shortness of Breath and/or Wheezing  dexAMETHasone  Injectable 4 milliGRAM(s) IV Push every 6 hours PRN Nausea  diphenhydrAMINE 25 milliGRAM(s) Oral every 4 hours PRN Pruritus  diphenhydrAMINE 25 milliGRAM(s) Oral every 6 hours PRN Pruritus  lanolin Ointment 1 Application(s) Topical every 6 hours PRN Sore Nipples  magnesium hydroxide Suspension 30 milliLiter(s) Oral two times a day PRN Constipation  nalbuphine Injectable 2.5 milliGRAM(s) IV Push every 6 hours PRN Pruritus  naloxone Injectable 0.1 milliGRAM(s) IV Push every 3 minutes PRN For ANY of the following changes in patient status:  A. Breaths Per Minute LESS THAN 10, B. Oxygen saturation LESS THAN 90%, C. Sedation score of 6 for Stop After: 4 Times  ondansetron Injectable 4 milliGRAM(s) IV Push every 6 hours PRN Nausea  oxyCODONE    IR 5 milliGRAM(s) Oral once PRN Moderate to Severe Pain (4-10)  oxyCODONE    IR 5 milliGRAM(s) Oral every 3 hours PRN Moderate to Severe Pain (4-10)  simethicone 80 milliGRAM(s) Chew every 4 hours PRN Gas        Assessment and Plan:    POD # 2 S/P RCS         Doing well.  Encouraged to ambulate.  Drink plenty of water and fluids.  Discharge home today.  Paient to take ibuprofen 600mg every 6 hours and/or Tylenol 2 (325mg tablet) tablets every 6 hoursfor pain  Follow up in 1-2 week(s) for incision check, CARLOS SIMMONS is to call the office for an appointment.  To use abdominal binder while awake as needed  Verbal discharge instructions d/w patient  - discharge summary to be given to her on discharge for the hospital.  Call for fevers, chills, nausea, vomiting, heavy vaginal bleeding, vaginal discharge, severe pain, symptoms of depression, problems with incision or any other concerning symptoms.  Nothing in vagina and no heavy lifting for  6-8 weeks. No sex!  No driving x 2 weeks, Do not drive if you are taking narcotics.  Patient to continue with prenatal vitamins.

## 2021-08-26 NOTE — PROGRESS NOTE ADULT - ASSESSMENT
A/P: 36yo POD#2 s/p LTCS.  Patient is stable and doing well post-operatively.    - Continue regular diet.  - Increase ambulation, encourage SCDs when not ambulating  - Continue motrin, tylenol, oxycodone PRN for pain control    CAM Avendano PGY1  
A/P: 36yo POD#1 s/p LTCS.  Patient is stable and doing well post-operatively.    - Continue regular diet.  - D/C Marsh and DTV 8 hours after removal. UOP adequate  - Increase ambulation, SCDs when not ambulating  - Continue motrin, tylenol, oxycodone PRN for pain control  - F/u AM CBC    CAM Avendano PGY1
stable
stable.

## 2021-08-26 NOTE — DISCHARGE NOTE OB - HOSPITAL COURSE
34 y/o  admitted at 39 weeks for repeat  section. she was delivered of a viable baby boy. Apgars were 8/9 and the weight was 7'11". She has had a benign postpartum course.

## 2021-08-26 NOTE — PROGRESS NOTE ADULT - SUBJECTIVE AND OBJECTIVE BOX
OB Progress Note: LTCS, POD#2    S: 36yo POD#2 s/p LTCS. Pain is well controlled. She is tolerating a regular diet but not passing flatus. She is voiding spontaneously, and ambulating without difficulty. Denies CP/SOB. Denies lightheadedness, dizziness, or N/V.    O:  Vitals:  Vital Signs Last 24 Hrs  T(C): 36.6 (25 Aug 2021 21:09), Max: 36.9 (25 Aug 2021 13:48)  T(F): 97.8 (25 Aug 2021 21:09), Max: 98.4 (25 Aug 2021 13:48)  HR: 76 (25 Aug 2021 21:09) (68 - 76)  BP: 100/61 (25 Aug 2021 21:09) (99/60 - 102/64)  BP(mean): --  RR: 18 (25 Aug 2021 21:09) (18 - 18)  SpO2: 98% (25 Aug 2021 21:09) (98% - 99%)    MEDICATIONS  (STANDING):  acetaminophen   Tablet .. 975 milliGRAM(s) Oral <User Schedule>  ceFAZolin   IVPB 2000 milliGRAM(s) IV Intermittent once  diphtheria/tetanus/pertussis (acellular) Vaccine (ADAcel) 0.5 milliLiter(s) IntraMuscular once  famotidine Injectable 20 milliGRAM(s) IV Push once  ferrous    sulfate 325 milliGRAM(s) Oral daily  folic acid 1 milliGRAM(s) Oral daily  heparin   Injectable 5000 Unit(s) SubCutaneous every 12 hours  ibuprofen  Tablet. 600 milliGRAM(s) Oral every 6 hours  lactated ringers. 1000 milliLiter(s) (75 mL/Hr) IV Continuous <Continuous>  montelukast 10 milliGRAM(s) Oral at bedtime  oxytocin Infusion 333.333 milliUNIT(s)/Min (1000 mL/Hr) IV Continuous <Continuous>  oxytocin Infusion 333.333 milliUNIT(s)/Min (1000 mL/Hr) IV Continuous <Continuous>  prenatal multivitamin 1 Tablet(s) Oral daily      MEDICATIONS  (PRN):  ALBUTerol    90 MICROgram(s) HFA Inhaler 2 Puff(s) Inhalation every 6 hours PRN Shortness of Breath and/or Wheezing  dexAMETHasone  Injectable 4 milliGRAM(s) IV Push every 6 hours PRN Nausea  diphenhydrAMINE 25 milliGRAM(s) Oral every 4 hours PRN Pruritus  diphenhydrAMINE 25 milliGRAM(s) Oral every 6 hours PRN Pruritus  lanolin Ointment 1 Application(s) Topical every 6 hours PRN Sore Nipples  magnesium hydroxide Suspension 30 milliLiter(s) Oral two times a day PRN Constipation  nalbuphine Injectable 2.5 milliGRAM(s) IV Push every 6 hours PRN Pruritus  naloxone Injectable 0.1 milliGRAM(s) IV Push every 3 minutes PRN For ANY of the following changes in patient status:  A. Breaths Per Minute LESS THAN 10, B. Oxygen saturation LESS THAN 90%, C. Sedation score of 6 for Stop After: 4 Times  ondansetron Injectable 4 milliGRAM(s) IV Push every 6 hours PRN Nausea  oxyCODONE    IR 5 milliGRAM(s) Oral every 3 hours PRN Moderate to Severe Pain (4-10)  oxyCODONE    IR 5 milliGRAM(s) Oral once PRN Moderate to Severe Pain (4-10)  simethicone 80 milliGRAM(s) Chew every 4 hours PRN Gas      Labs:  Blood type: O Positive  Rubella IgG: RPR: Negative      PE:  General: NAD  CV: RRR  Pulm: CTAB  Abdomen: Soft, appropriately tender, incision c/d/i. Fundus firm above umbilicus  Extremities: No calf tenderness, no pitting edema     OB Progress Note: LTCS, POD#2    S: 36yo POD#2 s/p LTCS. Pain is well controlled. She is tolerating a regular diet but not passing flatus. She is voiding spontaneously, and ambulating without difficulty. Denies CP/SOB. Denies lightheadedness, dizziness, or N/V.    O:  Vitals:  Vital Signs Last 24 Hrs  T(C): 36.6 (25 Aug 2021 21:09), Max: 36.9 (25 Aug 2021 13:48)  T(F): 97.8 (25 Aug 2021 21:09), Max: 98.4 (25 Aug 2021 13:48)  HR: 76 (25 Aug 2021 21:09) (68 - 76)  BP: 100/61 (25 Aug 2021 21:09) (99/60 - 102/64)  BP(mean): --  RR: 18 (25 Aug 2021 21:09) (18 - 18)  SpO2: 98% (25 Aug 2021 21:09) (98% - 99%)    MEDICATIONS  (STANDING):  acetaminophen   Tablet .. 975 milliGRAM(s) Oral <User Schedule>  ceFAZolin   IVPB 2000 milliGRAM(s) IV Intermittent once  diphtheria/tetanus/pertussis (acellular) Vaccine (ADAcel) 0.5 milliLiter(s) IntraMuscular once  famotidine Injectable 20 milliGRAM(s) IV Push once  ferrous    sulfate 325 milliGRAM(s) Oral daily  folic acid 1 milliGRAM(s) Oral daily  heparin   Injectable 5000 Unit(s) SubCutaneous every 12 hours  ibuprofen  Tablet. 600 milliGRAM(s) Oral every 6 hours  lactated ringers. 1000 milliLiter(s) (75 mL/Hr) IV Continuous <Continuous>  montelukast 10 milliGRAM(s) Oral at bedtime  oxytocin Infusion 333.333 milliUNIT(s)/Min (1000 mL/Hr) IV Continuous <Continuous>  oxytocin Infusion 333.333 milliUNIT(s)/Min (1000 mL/Hr) IV Continuous <Continuous>  prenatal multivitamin 1 Tablet(s) Oral daily      MEDICATIONS  (PRN):  ALBUTerol    90 MICROgram(s) HFA Inhaler 2 Puff(s) Inhalation every 6 hours PRN Shortness of Breath and/or Wheezing  dexAMETHasone  Injectable 4 milliGRAM(s) IV Push every 6 hours PRN Nausea  diphenhydrAMINE 25 milliGRAM(s) Oral every 4 hours PRN Pruritus  diphenhydrAMINE 25 milliGRAM(s) Oral every 6 hours PRN Pruritus  lanolin Ointment 1 Application(s) Topical every 6 hours PRN Sore Nipples  magnesium hydroxide Suspension 30 milliLiter(s) Oral two times a day PRN Constipation  nalbuphine Injectable 2.5 milliGRAM(s) IV Push every 6 hours PRN Pruritus  naloxone Injectable 0.1 milliGRAM(s) IV Push every 3 minutes PRN For ANY of the following changes in patient status:  A. Breaths Per Minute LESS THAN 10, B. Oxygen saturation LESS THAN 90%, C. Sedation score of 6 for Stop After: 4 Times  ondansetron Injectable 4 milliGRAM(s) IV Push every 6 hours PRN Nausea  oxyCODONE    IR 5 milliGRAM(s) Oral every 3 hours PRN Moderate to Severe Pain (4-10)  oxyCODONE    IR 5 milliGRAM(s) Oral once PRN Moderate to Severe Pain (4-10)  simethicone 80 milliGRAM(s) Chew every 4 hours PRN Gas      Labs:  Blood type: O Positive  Rubella IgG: RPR: Negative      PE:  General: NAD  CV: RRR  Pulm: CTAB  Abdomen: Soft, appropriately tender, incision c/d/i. Fundus firm at umbilicus  Extremities: No calf tenderness, no pitting edema

## 2021-08-26 NOTE — DISCHARGE NOTE OB - PATIENT PORTAL LINK FT
You can access the FollowMyHealth Patient Portal offered by [facility] by registering at the following website: [email or web address]. By joining [facility]’s FollowMyHealth portal, you will also be able to view your health information using other applications (apps) compatible with our system.

## 2023-11-09 NOTE — OB RN INTRAOPERATIVE NOTE - NS_FROZENSECTION_OBGYN_ALL_OB
Why don't we plan on repeating CMP and ionized Ca in 2 weeks and would also like to add on PTHrP, can see her 11/21 in the 1010 slot   No

## 2025-01-21 NOTE — OB RN PATIENT PROFILE - CAREGIVER NAME
Left message that our clinics are closed on 1/22/25 and we need to cancel your appointment.  Please call us back at [phone] to reschedule your appointment.  
zulma

## 2025-04-14 NOTE — PROGRESS NOTE ADULT - I HAVE PERSONALLY SEEN, EXAMINED, AND PARTICIPATED IN THE CARE OF THIS PATIENT.  I HAVE REVIEWED ALL PERTINENT CLINICAL INFORMATION, INCLUDING HISTORY, PHYSICAL EXAM, PLAN AND THE MEDICAL/PA/NP STUDENT’S NOTE AND AGREE EXCEPT AS NOTED.
"Chloe is a 47 year old who is being evaluated via a billable video visit.    How would you like to obtain your AVS? MyChart  If the video visit is dropped, the invitation should be resent by: Text to cell phone: [phone]  Will anyone else be joining your video visit? No  {If patient encounters technical issues they should call [phone] :[id number]}    June SHI 04/14/25    Video-Visit Details    Type of service:  Video Visit   Originating Location (pt. Location): {video visit patient location:702244::\"Home\"}  {PROVIDER LOCATION On-site should be selected for visits conducted from your clinic location or adjoining [facility] hospital, academic office, or other nearby [facility] building. Off-site should be selected for all other provider locations, including home:[id number]}  Distant Location (provider location):  {virtual location provider:[id number]}  Platform used for Video Visit: Jackrabbit  " Statement Selected

## 2025-07-30 NOTE — OB PROVIDER DELIVERY SUMMARY - NSPLACDELIVDETAIL_OBGYN_ALL_OB
Specialty Pharmacy Patient Management Program  Refill Outreach     Anu was contacted today regarding refills of their medication(s).    Refill Questions      Flowsheet Row Most Recent Value   Changes to allergies? No   Changes to medications? No   New conditions or infections since last clinic visit No   Unplanned office visit, urgent care, ED, or hospital admission in the last 4 weeks  No   How does patient/caregiver feel medication is working? Fair  [just started using]   Financial problems or insurance changes  No   Since the previous refill, were any specialty medication doses or scheduled injections missed or delayed?  No   Does this patient require a clinical escalation to a pharmacist? No            Delivery Questions      Flowsheet Row Most Recent Value   Delivery method [employer]   Delivery address verified with patient/caregiver? Yes   Delivery address Home   Number of medications in delivery 1   Medication(s) being filled and delivered Adalimumab (Humira (2 Pen))   Doses left of specialty medications 1   Copay verified? Yes   Copay amount 0   Copay form of payment No copayment ($0)   Delivery Date Selection 07/31/25   Signature Required No   Do you consent to receive electronic handouts?  Yes            Medication Adherence    Adherence tools used: directed education  Support network for adherence: family member          Follow-up: 21 day(s)     Dee Dee Castle, Pharmacy Technician  7/30/2025  08:49 EDT    
Spontaneous